# Patient Record
Sex: FEMALE | Race: WHITE | NOT HISPANIC OR LATINO | Employment: STUDENT | URBAN - METROPOLITAN AREA
[De-identification: names, ages, dates, MRNs, and addresses within clinical notes are randomized per-mention and may not be internally consistent; named-entity substitution may affect disease eponyms.]

---

## 2017-01-03 ASSESSMENT — ENCOUNTER SYMPTOMS
EYE REDNESS: 0
INCREASED ENERGY: 1
MUSCLE CRAMPS: 1
POLYPHAGIA: 0
POOR WOUND HEALING: 0
ALTERED TEMPERATURE REGULATION: 0
HOT FLASHES: 1
MEMORY LOSS: 0
ARTHRALGIAS: 1
EYE IRRITATION: 1
MUSCLE WEAKNESS: 1
SEIZURES: 0
HEADACHES: 1
BREAST MASS: 1
HALLUCINATIONS: 0
WEIGHT LOSS: 0
FATIGUE: 1
BACK PAIN: 1
SINUS PAIN: 1
JOINT SWELLING: 0
LOSS OF CONSCIOUSNESS: 0
TINGLING: 0
BREAST PAIN: 1
MYALGIAS: 1
FEVER: 0
NECK PAIN: 1
SORE THROAT: 0
NUMBNESS: 1
SPEECH CHANGE: 0
HOARSE VOICE: 0
SMELL DISTURBANCE: 0
NAIL CHANGES: 0
EYE PAIN: 0
STIFFNESS: 1
TROUBLE SWALLOWING: 0
DISTURBANCES IN COORDINATION: 0
TASTE DISTURBANCE: 0
SINUS CONGESTION: 1
DIZZINESS: 0
POLYDIPSIA: 0
DECREASED LIBIDO: 0
WEAKNESS: 1
TREMORS: 1
EYE WATERING: 0
CHILLS: 0
DECREASED APPETITE: 0
PARALYSIS: 0
WEIGHT GAIN: 0
NECK MASS: 0
SKIN CHANGES: 0
NIGHT SWEATS: 1
DOUBLE VISION: 0

## 2017-01-06 ENCOUNTER — ONCOLOGY VISIT (OUTPATIENT)
Dept: ONCOLOGY | Facility: CLINIC | Age: 22
End: 2017-01-06
Attending: INTERNAL MEDICINE
Payer: COMMERCIAL

## 2017-01-06 VITALS
HEIGHT: 62 IN | WEIGHT: 147.2 LBS | TEMPERATURE: 98.4 F | SYSTOLIC BLOOD PRESSURE: 120 MMHG | OXYGEN SATURATION: 97 % | HEART RATE: 81 BPM | DIASTOLIC BLOOD PRESSURE: 78 MMHG | RESPIRATION RATE: 16 BRPM | BODY MASS INDEX: 27.09 KG/M2

## 2017-01-06 DIAGNOSIS — D47.02 MAST CELL DISEASE, SYSTEMIC: Primary | ICD-10-CM

## 2017-01-06 PROCEDURE — 99212 OFFICE O/P EST SF 10 MIN: CPT | Mod: ZF

## 2017-01-06 PROCEDURE — 99214 OFFICE O/P EST MOD 30 MIN: CPT | Mod: ZP | Performed by: PHYSICIAN ASSISTANT

## 2017-01-06 ASSESSMENT — PAIN SCALES - GENERAL: PAINLEVEL: SEVERE PAIN (7)

## 2017-01-06 NOTE — NURSING NOTE
"Alex Rivas is a 21 year old female who presents for:  Chief Complaint   Patient presents with     Oncology Clinic Visit     Possible Mast Cell Activation Disorder        Initial Vitals:  /78 mmHg  Pulse 81  Temp(Src) 98.4  F (36.9  C) (Oral)  Resp 16  Ht 1.575 m (5' 2.01\")  Wt 66.769 kg (147 lb 3.2 oz)  BMI 26.92 kg/m2  SpO2 97%  LMP 12/28/2016 (Exact Date) Estimated body mass index is 26.92 kg/(m^2) as calculated from the following:    Height as of this encounter: 1.575 m (5' 2.01\").    Weight as of this encounter: 66.769 kg (147 lb 3.2 oz).. Body surface area is 1.71 meters squared. BP completed using cuff size: regular  Severe Pain (7) Patient's last menstrual period was 12/28/2016 (exact date). Allergies and medications reviewed.     Medications: Medication refills not needed today.  Pharmacy name entered into Go[employer]: [id number] - [address] [facility] THE [facility]    Comments:     7 minutes for nursing intake (face to face time)   Nohemy Newberry LPN          "

## 2017-01-06 NOTE — Clinical Note
"1/6/2017       RE: Alex Rivas  [address] [facility] [address]     Dear Colleague,    Thank you for referring your patient, Alex Rivas, to the [facility]. Please see a copy of my visit note below.    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  Please see his last note for PMH. The following was cut/paste from Dr Maynard's last clinic visit:     \"Therefore, based on (1) a clinical history highly consistent with chronic/recurrent aberrant mast cell  release, (2) an elevated plasma histamine in 9/15 (13 nmol/L, normal 0-8) (but a normal serum tryptase, largely ruling out systemic mastocytosis), (3) substantially increased mast cells (as high as 60/hpf, upper normal generally regarded as 20/hpf; note that, while elevated, this is nowhere near as high, nor in the typical aggregated pattern, as seen in systemic mastocytosis) seen in every single one of a slew of upper and lower GI tract biopsies obtained at [facility] in [place], [place] in 8/11, (4) absence of any other evident disease better accounting for the full range and chronicity of all the symptoms and findings in the case, and (5) at least partial response to mast-cell-targeted therapeutics (e.g., Benadryl), mast cell activation syndrome (MCAS; not systemic mastocytosis) is the underlying, unifying diagnosis (per Alexandro et al., J Hematol Oncol 2011) for the patient's almost-lifelong complex of multisystem polymorbidity of generally inflammatory +/- allergic theme.\"    INTERVAL HISTORY: Alex hasn't had much progress since I saw her last.  " "She did try a month of the Vitamin C with no effect.  She also tried the Quercetin but after a few days developed headaches.  She ended her college year sinusitis, ear infection, UTI and mast cell flare ups.  Since coming home she has been doing much better.  This weekend she had a two day flare, unknown what caused it, but had joint pains/full body arthritis and fatigue and basically slept for 2 days.  She is feeling better now.  She has been taking Claritin 10 mg BID and Zantac 150 mg BID and unsure if its really helping or not.    Last summer she really cleaned up her diet and only has a small amount of food she eats, however with cutting out gluten, dairy, sugar and many other things (night shades) she has felt better, been able to focus in school and feel healthier.  Still has occ headaches, joint pains and diarrhea.     EXAM:  /78 mmHg  Pulse 81  Temp(Src) 98.4  F (36.9  C) (Oral)  Resp 16  Ht 1.575 m (5' 2.01\")  Wt 66.769 kg (147 lb 3.2 oz)  BMI 26.92 kg/m2  SpO2 97%  LMP 12/28/2016 (Exact Date)  Wt Readings from Last 4 Encounters:   01/06/17 66.769 kg (147 lb 3.2 oz)   08/05/16 66.6 kg (146 lb 13.2 oz)   06/02/16 62.234 kg (137 lb 3.2 oz)   02/19/16 63.9 kg (140 lb 14 oz)     Well appearing 20 year old female, no apparent hives/itching/flushing.   Breathing well without stridor/wheeze.   Happy and communicative    LABS: no new labs    ASSESSMENT/PLAN: MCAS in a 21 year old college student who recently started on H1/H2 blockade with improvement in her abdominal pain.     Alex and her mom and I reviewed her diet which is no gluten, no dairy and low histamine and really given her the best relief.  She still had flares at the end of the school year which seemed triggered by infection, this is her typical pattern.  She is unaware what made her flare this last weekend but we reviewed a few things to keep an eye on.  In the meantime, her biggest symptoms continue to be joint pains and fatigue.  "     We discussed next trials.  First, I am unsure if she is deriving any benefit from the Claritin and I would like her to try Zyrtec and Allegra trials to see if one helps her better than the other.  Second, baby aspirin would be a good option as joint pains is her biggest issue.  I discussed the starting dose of 81 mg BID and then to escalate up to 4 pills BID over a month.  The third trial would be singulair.  We reviewed all the escalations and one month trials for each.   She will be back end of summer prior to going back to school.     Sana Buchanan PA-C      Answers for HPI/ROS submitted by the patient on 1/3/2017   General Symptoms: Yes  Skin Symptoms: Yes  HENT Symptoms: Yes  EYE SYMPTOMS: Yes  HEART SYMPTOMS: No  LUNG SYMPTOMS: No  INTESTINAL SYMPTOMS: No  URINARY SYMPTOMS: No  GYNECOLOGIC SYMPTOMS: Yes  BREAST SYMPTOMS: Yes  SKELETAL SYMPTOMS: Yes  BLOOD SYMPTOMS: No  NERVOUS SYSTEM SYMPTOMS: Yes  MENTAL HEALTH SYMPTOMS: No  Fever: No  Loss of appetite: No  Weight loss: No  Weight gain: No  Fatigue: Yes  Night sweats: Yes  Chills: No  Increased stress: Yes  Excessive hunger: No  Excessive thirst: No  Feeling hot or cold when others believe the temperature is normal: No  Loss of height: No  Post-operative complications: No  Surgical site pain: No  Hallucinations: No  Change in or Loss of Energy: Yes  Hyperactivity: No  Confusion: No  Changes in hair: No  Changes in moles/birth marks: No  Itching: Yes  Rashes: No  Changes in nails: No  Acne: Yes  Hair in places you don't want it: No  Change in facial hair: No  Warts: No  Non-healing sores: No  Scarring: No  Flaking of skin: No  Color changes of hands/feet in cold : No  Sun sensitivity: Yes  Skin thickening: No  Ear pain: No  Ear discharge: No  Hearing loss: No  Tinnitus: No  Nosebleeds: No  Congestion: Yes  Sinus pain: Yes  Trouble swallowing: No   Voice hoarseness: No  Mouth sores: No  Sore throat: No  Tooth pain: Yes  Gum tenderness: No  Bleeding gums:  No  Change in taste: No  Change in sense of smell: No  Dry mouth: No  Hearing aid used: No  Neck lump: No  Eye pain: No  Vision loss: No  Dry eyes: No  Watery eyes: No  Eye bulging: No  Double vision: No  Flashing of lights: No  Spots: Yes  Floaters: Yes  Redness: No  Crossed eyes: No  Tunnel Vision: No  Yellowing of eyes: No  Eye irritation: Yes  Back pain: Yes  Muscle aches: Yes  Neck pain: Yes  Swollen joints: No  Joint pain: Yes  Bone pain: Yes  Muscle cramps: Yes  Muscle weakness: Yes  Joint stiffness: Yes  Bone fracture: No  Trouble with coordination: No  Dizziness or trouble with balance: No  Fainting or black-out spells: No  Memory loss: No  Headache: Yes  Seizures: No  Speech problems: No  Tingling: No  Tremor: Yes  Weakness: Yes  Difficulty walking: No  Paralysis: No  Numbness: Yes  Bleeding or spotting between periods: No  Heavy or painful periods: Yes  Irregular periods: No  Vaginal discharge: No  Hot flashes: Yes  Vaginal dryness: No  Genital ulcers: No  Reduced libido: No  Painful intercourse: No  Difficulty with sexual arousal: No  Post-menopausal bleeding: No  Discharge: No  Lumps: Yes  Pain: Yes  Nipple retraction: No        Again, thank you for allowing me to participate in the care of your patient.      Sincerely,    Debo Buchanan PA-C

## 2017-01-06 NOTE — PROGRESS NOTES
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  Please see his last note for PMH. The following was cut/paste from Dr Maynard's last clinic visit:     \"Therefore, based on (1) a clinical history highly consistent with chronic/recurrent aberrant mast cell  release, (2) an elevated plasma histamine in 9/15 (13 nmol/L, normal 0-8) (but a normal serum tryptase, largely ruling out systemic mastocytosis), (3) substantially increased mast cells (as high as 60/hpf, upper normal generally regarded as 20/hpf; note that, while elevated, this is nowhere near as high, nor in the typical aggregated pattern, as seen in systemic mastocytosis) seen in every single one of a slew of upper and lower GI tract biopsies obtained at [facility] in [place], [place] in 8/11, (4) absence of any other evident disease better accounting for the full range and chronicity of all the symptoms and findings in the case, and (5) at least partial response to mast-cell-targeted therapeutics (e.g., Benadryl), mast cell activation syndrome (MCAS; not systemic mastocytosis) is the underlying, unifying diagnosis (per Alexandro et al., J Hematol Oncol 2011) for the patient's almost-lifelong complex of multisystem polymorbidity of generally inflammatory +/- allergic theme.\"    INTERVAL HISTORY: Alex is doing much better from our last visit. She is taking Allegra BID and Zantac BID and only had one flare up in the fall. This is pretty good considering school stress/diet, etc usually triggers her.  Her biggest issue is muscle and joint pain that persists outside of her usual flares.  She tried " "Vit C, Quercetin with no effect. She tried ASA and this made her have a HA. Currently she takes Aleve prn, unsure if this helps.  What helps the most is getting a massage once a week, but the effects don't last as long as she would like.  She continues her strict diet with out gluten, dairy, sugar and many other things (night shades).    EXAM:  /78 mmHg  Pulse 81  Temp(Src) 98.4  F (36.9  C) (Oral)  Resp 16  Ht 1.575 m (5' 2.01\")  Wt 66.769 kg (147 lb 3.2 oz)  BMI 26.92 kg/m2  SpO2 97%  LMP 12/28/2016 (Exact Date)  Wt Readings from Last 4 Encounters:   01/06/17 66.769 kg (147 lb 3.2 oz)   08/05/16 66.6 kg (146 lb 13.2 oz)   06/02/16 62.234 kg (137 lb 3.2 oz)   02/19/16 63.9 kg (140 lb 14 oz)     Well appearing 20 year old female, no apparent hives/itching/flushing.   Breathing well without stridor/wheeze.   Happy and communicative    LABS: no new labs    ASSESSMENT/PLAN: MCAS in a 21 year old college student with [employer]    Alex and her mom and I reviewed her diet which is no gluten, no dairy and low histamine and really given her the best relief.  She is so glad that after returning to school this fall she has only had one flare up.  She thinks the Allegra works much better for her and after reviewing with pharmacy, she found out there was lactose in the other H1 formularies, likely contributing to the intolerance.   In the meantime, her biggest symptoms continue to be joint pains and fatigue.      She and mom discussed PT for some of the joint and muscle pains and overall tightness. I think this is a good non-pharma option for her to get into some routine exercise and to help with stretching and mobility.  She would also like to entertain acupuncture.  I don't have much experience with MCAS patients and acupuncture, but I don't see the harm in trying.  I encouraged a low inflammatory diet to help with the inflammatory joints.  Also encouraged a trial of alpha-lipoic acid which can help with mast cell " related inflammatory pain.  She mentioned at the end of our visit new acne since last June.  Likely 2/2 to a medication change and she admitted her vitamin source had changed. Encouraged her to look into the excipients as that was likely the cause.     Return in 3 months    Sana Buchanan PA-C      Answers for HPI/ROS submitted by the patient on 1/3/2017   General Symptoms: Yes  Skin Symptoms: Yes  HENT Symptoms: Yes  EYE SYMPTOMS: Yes  HEART SYMPTOMS: No  LUNG SYMPTOMS: No  INTESTINAL SYMPTOMS: No  URINARY SYMPTOMS: No  GYNECOLOGIC SYMPTOMS: Yes  BREAST SYMPTOMS: Yes  SKELETAL SYMPTOMS: Yes  BLOOD SYMPTOMS: No  NERVOUS SYSTEM SYMPTOMS: Yes  MENTAL HEALTH SYMPTOMS: No  Fever: No  Loss of appetite: No  Weight loss: No  Weight gain: No  Fatigue: Yes  Night sweats: Yes  Chills: No  Increased stress: Yes  Excessive hunger: No  Excessive thirst: No  Feeling hot or cold when others believe the temperature is normal: No  Loss of height: No  Post-operative complications: No  Surgical site pain: No  Hallucinations: No  Change in or Loss of Energy: Yes  Hyperactivity: No  Confusion: No  Changes in hair: No  Changes in moles/birth marks: No  Itching: Yes  Rashes: No  Changes in nails: No  Acne: Yes  Hair in places you don't want it: No  Change in facial hair: No  Warts: No  Non-healing sores: No  Scarring: No  Flaking of skin: No  Color changes of hands/feet in cold : No  Sun sensitivity: Yes  Skin thickening: No  Ear pain: No  Ear discharge: No  Hearing loss: No  Tinnitus: No  Nosebleeds: No  Congestion: Yes  Sinus pain: Yes  Trouble swallowing: No   Voice hoarseness: No  Mouth sores: No  Sore throat: No  Tooth pain: Yes  Gum tenderness: No  Bleeding gums: No  Change in taste: No  Change in sense of smell: No  Dry mouth: No  Hearing aid used: No  Neck lump: No  Eye pain: No  Vision loss: No  Dry eyes: No  Watery eyes: No  Eye bulging: No  Double vision: No  Flashing of lights: No  Spots: Yes  Floaters: Yes  Redness:  No  Crossed eyes: No  Tunnel Vision: No  Yellowing of eyes: No  Eye irritation: Yes  Back pain: Yes  Muscle aches: Yes  Neck pain: Yes  Swollen joints: No  Joint pain: Yes  Bone pain: Yes  Muscle cramps: Yes  Muscle weakness: Yes  Joint stiffness: Yes  Bone fracture: No  Trouble with coordination: No  Dizziness or trouble with balance: No  Fainting or black-out spells: No  Memory loss: No  Headache: Yes  Seizures: No  Speech problems: No  Tingling: No  Tremor: Yes  Weakness: Yes  Difficulty walking: No  Paralysis: No  Numbness: Yes  Bleeding or spotting between periods: No  Heavy or painful periods: Yes  Irregular periods: No  Vaginal discharge: No  Hot flashes: Yes  Vaginal dryness: No  Genital ulcers: No  Reduced libido: No  Painful intercourse: No  Difficulty with sexual arousal: No  Post-menopausal bleeding: No  Discharge: No  Lumps: Yes  Pain: Yes  Nipple retraction: No

## 2017-04-10 ASSESSMENT — ENCOUNTER SYMPTOMS
INCREASED ENERGY: 1
NIGHT SWEATS: 1
SINUS PAIN: 1
BLOOD IN STOOL: 0
POOR WOUND HEALING: 0
MEMORY LOSS: 0
ABDOMINAL PAIN: 1
HALLUCINATIONS: 0
HEADACHES: 1
HEARTBURN: 0
PARALYSIS: 0
SKIN CHANGES: 0
TINGLING: 1
INSOMNIA: 1
SPEECH CHANGE: 0
JOINT SWELLING: 1
RECTAL PAIN: 0
SINUS CONGESTION: 1
WEIGHT LOSS: 0
FATIGUE: 1
VOMITING: 0
DOUBLE VISION: 0
SORE THROAT: 0
ARTHRALGIAS: 1
CONSTIPATION: 0
NERVOUS/ANXIOUS: 1
MUSCLE WEAKNESS: 1
BREAST PAIN: 0
BLOATING: 1
NUMBNESS: 1
NAIL CHANGES: 0
DECREASED APPETITE: 0
ALTERED TEMPERATURE REGULATION: 1
BREAST MASS: 1
DIARRHEA: 1
DECREASED CONCENTRATION: 1
DISTURBANCES IN COORDINATION: 0
DIZZINESS: 0
MYALGIAS: 1
TREMORS: 0
BOWEL INCONTINENCE: 0
HOT FLASHES: 1
POLYPHAGIA: 0
TASTE DISTURBANCE: 0
SEIZURES: 0
WEAKNESS: 1
DECREASED LIBIDO: 0
EYE REDNESS: 0
JAUNDICE: 0
EYE WATERING: 1
DEPRESSION: 0
POLYDIPSIA: 0
BACK PAIN: 1
STIFFNESS: 1
WEIGHT GAIN: 1
EYE PAIN: 0
EYE IRRITATION: 1
NECK MASS: 0
PANIC: 0
FEVER: 0
SMELL DISTURBANCE: 0
NAUSEA: 0
MUSCLE CRAMPS: 1
TROUBLE SWALLOWING: 0
LOSS OF CONSCIOUSNESS: 0
RECTAL BLEEDING: 0
NECK PAIN: 1
CHILLS: 0
HOARSE VOICE: 0

## 2017-04-11 ENCOUNTER — ONCOLOGY VISIT (OUTPATIENT)
Dept: ONCOLOGY | Facility: CLINIC | Age: 22
End: 2017-04-11
Attending: INTERNAL MEDICINE
Payer: COMMERCIAL

## 2017-04-11 VITALS
TEMPERATURE: 96.7 F | HEIGHT: 62 IN | SYSTOLIC BLOOD PRESSURE: 115 MMHG | DIASTOLIC BLOOD PRESSURE: 79 MMHG | WEIGHT: 153.4 LBS | HEART RATE: 79 BPM | OXYGEN SATURATION: 100 % | RESPIRATION RATE: 18 BRPM | BODY MASS INDEX: 28.23 KG/M2

## 2017-04-11 DIAGNOSIS — D89.42 IDIOPATHIC MAST CELL ACTIVATION SYNDROME (H): Primary | Chronic | ICD-10-CM

## 2017-04-11 PROCEDURE — 99215 OFFICE O/P EST HI 40 MIN: CPT | Mod: ZP | Performed by: INTERNAL MEDICINE

## 2017-04-11 PROCEDURE — 99212 OFFICE O/P EST SF 10 MIN: CPT | Mod: ZF

## 2017-04-11 ASSESSMENT — PAIN SCALES - GENERAL: PAINLEVEL: SEVERE PAIN (7)

## 2017-04-11 NOTE — PROGRESS NOTES
"Patient: Alex Rivas   (MRN [id number])   Encounter Date: Apr 11, 2017  Referring: Didi Polo M.D. ([facility], [address] [place] [address], [address], [place] [address], [phone], fax [phone])  Attending: Harinder Lassiter M.D.     Current Diagnosis: Based on (1) a clinical history highly consistent with chronic/recurrent aberrant mast cell  release, (2) an elevated plasma histamine in 9/15 (13 nmol/L, normal 0-8) (but a normal serum tryptase, largely ruling out systemic mastocytosis), (3) substantially increased mast cells (as high as 60/hpf, upper normal generally regarded as 20/hpf; note that, while elevated, this is nowhere near as high, nor in the typical aggregated pattern, as seen in systemic mastocytosis) seen in every single one of a slew of upper and lower GI tract biopsies obtained at [facility] in [place], [place] in 8/11, (4) absence of any other evident disease better accounting for the full range and chronicity of all the symptoms and findings in the case, and (5) at least partial response to mast-cell-targeted therapeutics (e.g., Benadryl), mast cell activation syndrome (MCAS; 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  Bidirectional endoscopy a month after onset of these symptoms found only mild chronic inflammation in the sole (gastric) biopsy obtained.  No clear cause was identified, and the patient says no specific treatment recommendations were provided.  She then underwent allergy evaluation (looking for celiac disease among other possibilities), but all of this evaluation was negative, too.  She continued " "to suffer recurring (often post-prandial) episodes of about three hours of intense epigastric pain and diarrhea followed by a day and a half of feeling utterly \"wiped out.\"  She next consulted a nutritionist who advised starting Prevacid and changing to a gluten- and dairy-free diet.  She still suffered her \"episodes\" but found she could not tolerate a wider range of foods in this fashion.  At age 17 she came to be afflicted by essentially monthly episodes of sinusitis and/or bronchitis.  Episodes of hot flashes and new severe dysmenorrhea (cramps and vomiting) on the first day of her menstrual cycles emerged.  She was again evaluated by an allergist with essentially negative findings.  Vitamin D deficiency was found.  A new problem with constant headaches (pain rating 7/10) emerged.  At age 18 she suffered sudden onset of left eyelid droop and partial left facial numbness.  She saw a neurologist.  Evaluation was unrevealing.  She tried many migraine medications, finding they helped little and each caused various unsettling side effects.  Multi-level spinal column pain, diffuse cutaneous hypersensitivity, and new numbness in the extremities and diffuse tremors emerged.  Multiple further GI evaluations all continued to be unrevealing.  A lower back subcutaneous abscess developed.  She saw many back specialists for back pain; all of those evaluations were unrevealing.  Physical therapy and chiropractic were unhelpful.  She was diagnosed with spondylolisthesis.  She suffered ongoing sinus infections.  She began suffering orthopedic injuries (e.g., sprains) from trivial degrees of stress/trauma.  She was slow to heal from all of these injuries.  Evaluation by an ENT specialist was essentially unrevealing (a deviated septum was noted).  Her cognitive function, and performance at school, declined; she was diagnosed with a learning disorder.  She was referred to the [facility] for evaluation and was diagnosed with " visceral hypersensitivity and fibromyalgia.  She consulted with allergist/immunologist Blade Adams in the local area here and was diagnosed with IgG4 deficiency.  At age 19 she began suffering waxing/waning adenitis/adenopathy in the bilateral axillary regions.  Ultrasound was unrevealing.  The problem was unimproved with multiple changes of deodorants.  At age 20 she began suffering intermittent chest pain.  Pleurisy was diagnosed.  Fatigue worsened.  Intermittent left pupillary enlargement was noted.  Ophthalmologic evaluation was unrevealing and she was diagnosed with benign episodic pupillary enlargement.  She consulted with Erik Polo and Smith.  Dr. Mtz found a homocysteine level about double the upper limit of normal.  He also found deficiencies of various B vitamins and folate.  She had been suffering insomnia and discovered that Benadryl helped this significantly.  She continued suffering erratic anaphylaxis, reacting to certain foods at some point and not at other points.  She obtained genomic analysis from [address] [place] [address], which reported the usual plethora of polymorphisms including homozygous MTHFR C677T.  A CBCD in 5/15 was normal.  Dr. Mtz suggested her problems might be due to mast cell disease and recommended she consult here.  [facility] is additionally notable for abdominal pain, chronic headache, fatigue, chronic back pain, bloating, irritable bowel syndrome, chronic neck pain, food intolerance, insomnia, watery eyes, stuffy nose, sinus problems, hay fever, sneezing attacks, excessive mucus, back acne, dry skin, hair loss, excessive sweat, easy bruising, earaches, dyspnea on mild exertion, fatigue, lethargy, restlessness, arthralgias, stiffness, myalgias, weakness, poor memory, poor concentration, learning disabilities, anxiety, panic, worry, seasonal affective disorder, cold intolerance, hot flashes, night sweats, frequent urination, painful cramping, frequent hunger, contact dermatitis,  "urticaria, and deteriorating vision.  On a full ROS at her initial visit here in 9/15, she endorsed a wide range of chronic/recurrent, episodic/intermittent and/or waxing/waning issues including subjective (but not objective) fevers, flushing, fatigue (often to the point of exhaustion), malaise, headaches, diffusely migratory aching/pain, unprovoked soaking sweats, unprovoked fluctuations in weight and appetite, irritation of the eyes, tinnitus, easy bruising, sinonasal congestion, coryza, non-anginal chest discomfort/pain, rare gastroesophageal reflux, vomiting on the first day of her menstrual cycles, diarrhea alternating with constipation, rare diffusely migratory abdominal discomfort, bloating, diffusely migratory weakness, occasional edema about her wrists, tingling/numbness in the left upper extremity when she sits up straight and thrusts her shoulders back, waxing/waning bilateral axillary adenopathy and adenitis, orthostatic and non-orthostatic presyncope, hair loss (better since vitamin supplementation was started), poor growth of her great toenails, poor healing, and insomnia (greatly helped by Benadryl).  Family history is notable for a mother with hypertension, a father with an AVM, kidney stones, hypertension, and heart disease, a sister with hypothyroidism, and another sister with \"a slew of stomach issues and allergy issues.\" The patient denies any history of smoking, significant alcohol use (it quickly causes her a headache), or illegal substance use.    Current Therapy: Cetirizine 10 mg bid, clindamycin solution topically bid, diphenhydramine 25 mg qhs, ranitidine 150 mg bid, methylcobalamin 2500 mcg/d, vitamin D 01689 units/d, vitamin C 1000 mg qd, fish oil 1 g/d, Ultra Dorene 60 units/d, Adderall 10 mg/d, and a daily multivitamin..  She lists her current allergies as severe GI disturbance to dairy products and anaphylaxis to gluten.    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  She has not noticed any improvements from these antihistamines changes.  She says the above-noted antihistamines changes are the only changes she has made in her regimen since she was last here several weeks ago for a visit with Ms. Buchanan.  She says exercise makes her diffuse pain worse.  No other fundamentally new problems.  ROS o/w neg.    Exam finds a slightly overweight young woman in no apparent distress at all despite a pain score of 7/10 (up from 4/10 when I last saw her in 8/16), pleasant/happy, cooperative, fully alert and oriented, easily independently ambulatory, presenting again with her mother.  Vitals per chart, unremarkable but for weight up another 6 pounds since 8/16 to 153 pounds.  Key findings are her comfortable general appearance, HEENT benign, no plethora/pallor/diaphoresis/jaundice/rash/bleeding/bruising, neck supple (no apparent limitations in range of motion and no pain behaviors on ROM), no JVD or thyromegaly or carotid bruits, no palpable adenopathy or tenderness at any of the usual node-bearing sites (note continuing complete resolution of her original presenting bilateral (worse on left) axillary tenderness), clear lungs, regular heart with no adventitious sounds, abdomen benign (note continuing complete resolution of her original presenting minimal epigastric tenderness), no peripheral edema, neuro grossly intact.  She does report some tenderness once again on  palpation generally about the neck and generally about the lower spinal column.   strength appears normal and did not appear to provoke pain behaviors.  Previously, on a light scratch test on the upper back at her initial visit here in 9/15, moderately bright dermatographism (erythroderma only, no hives) quickly emerged and was almost fully sustained when last checked 10 minutes later, but we did not recheck this today.    No new labs here today.  She shows me labs run by one of her other providers in 3/17 showing normal routine blood counts (except for a very slight decrease in red cell count) and routine chemistries (except for ALT 73 (normal 0-45)), normal iron studies and thyroid function tests, normal ESR and CRP, normal homocysteine, normal anti-thyroid antibodies, normal vitamin D, normal insulin, and normal anti-CCP.  Lyme antibody testing showed a positive IgG p41 and a positive IgM p23. She cannot recall any recent tick bites (or activities/travel that would have placed her at increased risk for such), let alone the characteristic rash of Lyme disease.    A/P: Underlying/unifying diagnosis (MCAS) as detailed above, seems clearly worse for having made what would seem to be some very innocent antihistamine changes -- thus strongly suggesting her worsening most likely is due to reactivity of her dysfunctional mast cells to one or more of the excipients in her current Zyrtec and/or Zantac formulations.  I recommended she return to her former once-daily Claritin/Zantac regimen to see if she can regain her prior baseline, and if that proves to be the case, then she will need to move more cautiously (e.g., next try increasing Zantac from qd to bid) to try to figure out which of the two antihistamine changes she made at the same time in this last interval likely caused her to feel worse, and once the drug (Zyrtec or Zantac) causing the worsening has been identified, then it would behoove her to work with her  "pharmacist to find and try alternative formulations of the culprit drug which do not contain the suspected offending excipient(s).  She will also try to compare the excipients in her Zantac and Zyrtec to the excipients in the medication products she previously has not tolerated (e.g., aspirin).  (Note she tolerates naproxen just fine, suggesting she probably tolerates aspirin OK, implying that her poor tolerance of the one aspirin product she tried was directed not at the aspirin but rather at one or more of the excipients in that particular aspirin product.)  She appears to understand.  I once again reminded her that \"Step 1\" in managing most mast cell disorders is to identify (as precisely as possible), and then avoid (as much as possible), the triggers for activation flare-ups of the patient's dysfunctional mast cells.    As is usually the case with visits for mast cell disease, this was a long encounter, 50 minutes in total, with 40 minutes spent in counseling.      Typed, reviewed, and electronically signed by: Harinder Lassiter M.D.     DT: 04/11/2017 11:54 PM    cc: 1. Didi Polo M.D. ([facility], [address] [place] [address], [address], [place] [address], [phone], fax [phone])  "

## 2017-04-11 NOTE — LETTER
"4/11/2017       RE: Alex Rivas  [address]  [facility] [address]     Dear Colleague,    Thank you for referring your patient, Alex Rivas, to the [facility]. Please see a copy of my visit note below.    Patient: Alex Rivas   (MRN [id number])   Encounter Date: Apr 11, 2017  Referring: Didi Polo M.D. ([facility], [address] [place] [address], [address], [place] [id number], [phone], fax [phone])  Attending: Harinder Lassiter M.D.     Current Diagnosis: Based on (1) a clinical history highly consistent with chronic/recurrent aberrant mast cell  release, (2) an elevated plasma histamine in 9/15 (13 nmol/L, normal 0-8) (but a normal serum tryptase, largely ruling out systemic mastocytosis), (3) substantially increased mast cells (as high as 60/hpf, upper normal generally regarded as 20/hpf; note that, while elevated, this is nowhere near as high, nor in the typical aggregated pattern, as seen in systemic mastocytosis) seen in every single one of a slew of upper and lower GI tract biopsies obtained at [facility] in [place], [place] in 8/11, (4) absence of any other evident disease better accounting for the full range and chronicity of all the symptoms and findings in the case, and (5) at least partial response to mast-cell-targeted therapeutics (e.g., Benadryl), mast cell activation syndrome (MCAS; 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  Bidirectional endoscopy a month after onset of these symptoms found only mild chronic inflammation in the sole (gastric) biopsy obtained.  No clear cause was " "identified, and the patient says no specific treatment recommendations were provided.  She then underwent allergy evaluation (looking for celiac disease among other possibilities), but all of this evaluation was negative, too.  She continued to suffer recurring (often post-prandial) episodes of about three hours of intense epigastric pain and diarrhea followed by a day and a half of feeling utterly \"wiped out.\"  She next consulted a nutritionist who advised starting Prevacid and changing to a gluten- and dairy-free diet.  She still suffered her \"episodes\" but found she could not tolerate a wider range of foods in this fashion.  At age 17 she came to be afflicted by essentially monthly episodes of sinusitis and/or bronchitis.  Episodes of hot flashes and new severe dysmenorrhea (cramps and vomiting) on the first day of her menstrual cycles emerged.  She was again evaluated by an allergist with essentially negative findings.  Vitamin D deficiency was found.  A new problem with constant headaches (pain rating 7/10) emerged.  At age 18 she suffered sudden onset of left eyelid droop and partial left facial numbness.  She saw a neurologist.  Evaluation was unrevealing.  She tried many migraine medications, finding they helped little and each caused various unsettling side effects.  Multi-level spinal column pain, diffuse cutaneous hypersensitivity, and new numbness in the extremities and diffuse tremors emerged.  Multiple further GI evaluations all continued to be unrevealing.  A lower back subcutaneous abscess developed.  She saw many back specialists for back pain; all of those evaluations were unrevealing.  Physical therapy and chiropractic were unhelpful.  She was diagnosed with spondylolisthesis.  She suffered ongoing sinus infections.  She began suffering orthopedic injuries (e.g., sprains) from trivial degrees of stress/trauma.  She was slow to heal from all of these injuries.  Evaluation by an ENT specialist was " essentially unrevealing (a deviated septum was noted).  Her cognitive function, and performance at school, declined; she was diagnosed with a learning disorder.  She was referred to the [facility] for evaluation and was diagnosed with visceral hypersensitivity and fibromyalgia.  She consulted with allergist/immunologist Blade Adams in the local area here and was diagnosed with IgG4 deficiency.  At age 19 she began suffering waxing/waning adenitis/adenopathy in the bilateral axillary regions.  Ultrasound was unrevealing.  The problem was unimproved with multiple changes of deodorants.  At age 20 she began suffering intermittent chest pain.  Pleurisy was diagnosed.  Fatigue worsened.  Intermittent left pupillary enlargement was noted.  Ophthalmologic evaluation was unrevealing and she was diagnosed with benign episodic pupillary enlargement.  She consulted with Erik Polo and Smith.  Dr. Mtz found a homocysteine level about double the upper limit of normal.  He also found deficiencies of various B vitamins and folate.  She had been suffering insomnia and discovered that Benadryl helped this significantly.  She continued suffering erratic anaphylaxis, reacting to certain foods at some point and not at other points.  She obtained genomic analysis from [address] [place] [address], which reported the usual plethora of polymorphisms including homozygous MTHFR C677T.  A CBCD in 5/15 was normal.  Dr. Mtz suggested her problems might be due to mast cell disease and recommended she consult here.  [facility] is additionally notable for abdominal pain, chronic headache, fatigue, chronic back pain, bloating, irritable bowel syndrome, chronic neck pain, food intolerance, insomnia, watery eyes, stuffy nose, sinus problems, hay fever, sneezing attacks, excessive mucus, back acne, dry skin, hair loss, excessive sweat, easy bruising, earaches, dyspnea on mild exertion, fatigue, lethargy, restlessness, arthralgias, stiffness, myalgias,  "weakness, poor memory, poor concentration, learning disabilities, anxiety, panic, worry, seasonal affective disorder, cold intolerance, hot flashes, night sweats, frequent urination, painful cramping, frequent hunger, contact dermatitis, urticaria, and deteriorating vision.  On a full ROS at her initial visit here in 9/15, she endorsed a wide range of chronic/recurrent, episodic/intermittent and/or waxing/waning issues including subjective (but not objective) fevers, flushing, fatigue (often to the point of exhaustion), malaise, headaches, diffusely migratory aching/pain, unprovoked soaking sweats, unprovoked fluctuations in weight and appetite, irritation of the eyes, tinnitus, easy bruising, sinonasal congestion, coryza, non-anginal chest discomfort/pain, rare gastroesophageal reflux, vomiting on the first day of her menstrual cycles, diarrhea alternating with constipation, rare diffusely migratory abdominal discomfort, bloating, diffusely migratory weakness, occasional edema about her wrists, tingling/numbness in the left upper extremity when she sits up straight and thrusts her shoulders back, waxing/waning bilateral axillary adenopathy and adenitis, orthostatic and non-orthostatic presyncope, hair loss (better since vitamin supplementation was started), poor growth of her great toenails, poor healing, and insomnia (greatly helped by Benadryl).  Family history is notable for a mother with hypertension, a father with an AVM, kidney stones, hypertension, and heart disease, a sister with hypothyroidism, and another sister with \"a slew of stomach issues and allergy issues.\" The patient denies any history of smoking, significant alcohol use (it quickly causes her a headache), or illegal substance use.    Current Therapy: Cetirizine 10 mg bid, clindamycin solution topically bid, diphenhydramine 25 mg qhs, ranitidine 150 mg bid, methylcobalamin 2500 mcg/d, vitamin D 58904 units/d, vitamin C 1000 mg qd, fish oil 1 g/d, " Ultra Dorene 60 units/d, Adderall 10 mg/d, and a daily multivitamin..  She lists her current allergies as severe GI disturbance to dairy products and anaphylaxis to gluten.    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 returns in scheduled follow-up unfortunately reporting that since switching recently (at Sana Buchanan's reasonable suggestion) from Claritin 10 mg po qd and Zantac 150 mg po qd to Zyrtec 10 mg po bid and Zantac 150 mg po bid, she has done worse, with increasing pain in the neck, back, and especially the joints, plus increasing weight in spite of a healthy diet (she says a dietician she consulted told her she shouldn't be putting on weight with her current diet).  She has not noticed any improvements from these antihistamines changes.  She says the above-noted antihistamines changes are the only changes she has made in her regimen since she was last here several weeks ago for a visit with Ms. Buchanan.  She says exercise makes her diffuse pain worse.  No other fundamentally new problems.  ROS o/w neg.    Exam finds a slightly overweight young woman in no apparent distress at all despite a pain score of 7/10 (up from 4/10 when I last saw her in 8/16), pleasant/happy, cooperative, fully alert and oriented, easily independently ambulatory, presenting again with her mother.  Vitals per chart, unremarkable but for weight up another 6 pounds since 8/16 to 153 pounds.  Key findings are her comfortable general appearance, HEENT benign, no plethora/pallor/diaphoresis/jaundice/rash/bleeding/bruising, neck supple (no apparent limitations in range of motion and no pain behaviors on ROM), no JVD or thyromegaly or carotid bruits, no palpable adenopathy or tenderness at any of the usual node-bearing sites (note continuing complete resolution of her original presenting bilateral (worse on left) axillary tenderness), clear lungs, regular  heart with no adventitious sounds, abdomen benign (note continuing complete resolution of her original presenting minimal epigastric tenderness), no peripheral edema, neuro grossly intact.  She does report some tenderness once again on palpation generally about the neck and generally about the lower spinal column.   strength appears normal and did not appear to provoke pain behaviors.  Previously, on a light scratch test on the upper back at her initial visit here in 9/15, moderately bright dermatographism (erythroderma only, no hives) quickly emerged and was almost fully sustained when last checked 10 minutes later, but we did not recheck this today.    No new labs here today.  She shows me labs run by one of her other providers in 3/17 showing normal routine blood counts (except for a very slight decrease in red cell count) and routine chemistries (except for ALT 73 (normal 0-45)), normal iron studies and thyroid function tests, normal ESR and CRP, normal homocysteine, normal anti-thyroid antibodies, normal vitamin D, normal insulin, and normal anti-CCP.  Lyme antibody testing showed a positive IgG p41 and a positive IgM p23. She cannot recall any recent tick bites (or activities/travel that would have placed her at increased risk for such), let alone the characteristic rash of Lyme disease.    A/P: Underlying/unifying diagnosis (MCAS) as detailed above, seems clearly worse for having made what would seem to be some very innocent antihistamine changes -- thus strongly suggesting her worsening most likely is due to reactivity of her dysfunctional mast cells to one or more of the excipients in her current Zyrtec and/or Zantac formulations.  I recommended she return to her former once-daily Claritin/Zantac regimen to see if she can regain her prior baseline, and if that proves to be the case, then she will need to move more cautiously (e.g., next try increasing Zantac from qd to bid) to try to figure out which of  "the two antihistamine changes she made at the same time in this last interval likely caused her to feel worse, and once the drug (Zyrtec or Zantac) causing the worsening has been identified, then it would behoove her to work with her pharmacist to find and try alternative formulations of the culprit drug which do not contain the suspected offending excipient(s).  She will also try to compare the excipients in her Zantac and Zyrtec to the excipients in the medication products she previously has not tolerated (e.g., aspirin).  (Note she tolerates naproxen just fine, suggesting she probably tolerates aspirin OK, implying that her poor tolerance of the one aspirin product she tried was directed not at the aspirin but rather at one or more of the excipients in that particular aspirin product.)  She appears to understand.  I once again reminded her that \"Step 1\" in managing most mast cell disorders is to identify (as precisely as possible), and then avoid (as much as possible), the triggers for activation flare-ups of the patient's dysfunctional mast cells.    As is usually the case with visits for mast cell disease, this was a long encounter, 50 minutes in total, with 40 minutes spent in counseling.      Typed, reviewed, and electronically signed by: Harinder Lassiter M.D.     DT: 04/11/2017 11:54 PM    cc: 1. Didi Polo M.D. ([facility], [address] [place] [address], [address] [address], [phone], fax [phone])    Again, thank you for allowing me to participate in the care of your patient.      Sincerely,    Harinder Lassiter MD      "

## 2017-04-11 NOTE — NURSING NOTE
"Alex Rivas is a 21 year old female who presents for:  Chief Complaint   Patient presents with     Oncology Clinic Visit     Mast cell disease, systemic        Initial Vitals:  /79 (BP Location: Left arm, Patient Position: Chair, Cuff Size: Adult Regular)  Pulse 79  Temp 96.7  F (35.9  C) (Oral)  Resp 18  Ht 1.575 m (5' 2.01\")  Wt 69.6 kg (153 lb 6.4 oz)  LMP 03/30/2017  SpO2 100%  BMI 28.05 kg/m2 Estimated body mass index is 28.05 kg/(m^2) as calculated from the following:    Height as of this encounter: 1.575 m (5' 2.01\").    Weight as of this encounter: 69.6 kg (153 lb 6.4 oz).. Body surface area is 1.74 meters squared. BP completed using cuff size: regular  Severe Pain (7) Patient's last menstrual period was 03/30/2017. Allergies and medications reviewed.     Medications: Medication refills not needed today.  Pharmacy name entered into abeo: [id number] - [address] [place] [facility]    Comments:     7 minutes for nursing intake (face to face time)   Cathie Flannery MA        "

## 2017-04-11 NOTE — MR AVS SNAPSHOT
After Visit Summary   4/11/2017    Alex Rivas    MRN: [id number]           Patient Information     Date Of Birth          1995        Visit Information        Provider Department      4/11/2017 2:00 PM Harinder Lassiter MD [facility]        Today's Diagnoses     Idiopathic mast cell activation syndrome    -  1       Follow-ups after your visit        Follow-up notes from your care team     Return in about 3 months (around 7/11/2017).      Your next 10 appointments already scheduled     Aug 11, 2017  8:40 AM CDT   (Arrive by 8:25 AM)   Return Visit with Debo Buchanan PA-C   [facility] ([facility])    [address] [place] [id number] [phone]            Nov 21, 2017  3:00 PM CST   (Arrive by 2:45 PM)   Return Visit with Harinder Lassiter MD   [facility] ([facility])    [address] [place] [id number] [phone]              Who to contact     If you have questions or need follow up information about today's clinic visit or your schedule please contact [facility] directly at [phone].  Normal or non-critical lab and imaging results will be communicated to you by Embarklyhart, letter or phone within 4 business days after the clinic has received the results. If you do not hear from us within 7 days, please contact the clinic through Embarklyhart or phone. If you have a critical or abnormal lab result, we will notify you by phone as soon as possible.  Submit refill requests through mii or call your pharmacy and they will forward the refill request to us. Please allow 3 business days for your refill to be completed.          Additional Information About Your Visit        Embarklyhart Information     mii gives you secure access to your electronic health record. If you see a primary care provider, you can  "also send messages to your care team and make appointments. If you have questions, please call your primary care clinic.  If you do not have a primary care provider, please call [phone] and they will assist you.        Care EveryWhere ID     This is your Care EveryWhere ID. This could be used by other organizations to access your [place] medical records  [id number]        Your Vitals Were     Pulse Temperature Respirations Height Last Period Pulse Oximetry    79 96.7  F (35.9  C) (Oral) 18 1.575 m (5' 2.01\") 03/30/2017 100%    BMI (Body Mass Index)                   28.05 kg/m2            Blood Pressure from Last 3 Encounters:   04/11/17 115/79   01/06/17 120/78   08/05/16 117/81    Weight from Last 3 Encounters:   04/11/17 69.6 kg (153 lb 6.4 oz)   01/06/17 66.8 kg (147 lb 3.2 oz)   08/05/16 66.6 kg (146 lb 13.2 oz)              Today, you had the following     No orders found for display       Primary Care Provider Office Phone # Fax #    Sanjuana Ball PA-C [phone]       [facility] [address] [facility] [address] [facility] [address]  [facility] [address]        Thank you!     Thank you for choosing [facility]  for your care. Our goal is always to provide you with excellent care. Hearing back from our patients is one way we can continue to improve our services. Please take a few minutes to complete the written survey that you may receive in the mail after your visit with us. Thank you!             Your Updated Medication List - Protect others around you: Learn how to safely use, store and throw away your medicines at www.disposemymeds.org.          This list is accurate as of: 4/11/17 11:55 PM.  Always use your most recent med list.                   Brand Name Dispense Instructions for use    ADDERALL PO      Take 10 mg by mouth daily as needed       ALLEGRA PO      Take by mouth daily       AMOXICILLIN PO      Take by mouth 2 times daily 4 days left as of 4/11/17       BENADRYL PO     "  Take 25 mg by mouth daily       clindamycin 1 % solution    CLEOCIN T     Apply topically 2 times daily       Methylcobalamin 1000 MCG Subl      Place 2,500 mcg under the tongue daily       Multi-vitamin Tabs tablet      Take 1 tablet by mouth daily       OMEGA-3 FISH OIL PO      Take 1 g by mouth daily       UNABLE TO FIND      Take 60 Units by mouth daily MEDICATION NAME: Ultra Dorene Ib       VITAMIN C PO      Take 1,000 mg by mouth       VITAMIN D (CHOLECALCIFEROL) PO      Take 20,000 Units by mouth daily       ZANTAC PO      Take 150 mg by mouth 2 times daily

## 2017-04-11 NOTE — Clinical Note
Patient waiting.  RTC ~3 months to see YURIDIA Church (no labs), also RTC ~6-8 months to see me (no labs).

## 2017-10-03 ENCOUNTER — OFFICE VISIT (OUTPATIENT)
Dept: FAMILY MEDICINE | Facility: CLINIC | Age: 22
End: 2017-10-03
Payer: COMMERCIAL

## 2017-10-03 ENCOUNTER — RADIANT APPOINTMENT (OUTPATIENT)
Dept: GENERAL RADIOLOGY | Facility: CLINIC | Age: 22
End: 2017-10-03
Attending: NURSE PRACTITIONER
Payer: COMMERCIAL

## 2017-10-03 VITALS
WEIGHT: 158 LBS | HEART RATE: 105 BPM | DIASTOLIC BLOOD PRESSURE: 76 MMHG | BODY MASS INDEX: 29.08 KG/M2 | HEIGHT: 62 IN | SYSTOLIC BLOOD PRESSURE: 112 MMHG | TEMPERATURE: 98.6 F | OXYGEN SATURATION: 96 %

## 2017-10-03 DIAGNOSIS — D72.828 NEUTROPHILIA: ICD-10-CM

## 2017-10-03 DIAGNOSIS — R05.9 COUGH: ICD-10-CM

## 2017-10-03 DIAGNOSIS — D89.42 IDIOPATHIC MAST CELL ACTIVATION SYNDROME (H): Chronic | ICD-10-CM

## 2017-10-03 DIAGNOSIS — J18.9 LINGULAR PNEUMONIA: ICD-10-CM

## 2017-10-03 DIAGNOSIS — R05.9 COUGH: Primary | ICD-10-CM

## 2017-10-03 LAB
BASOPHILS # BLD AUTO: 0 10E9/L (ref 0–0.2)
BASOPHILS NFR BLD AUTO: 0.1 %
DIFFERENTIAL METHOD BLD: ABNORMAL
EOSINOPHIL # BLD AUTO: 0.3 10E9/L (ref 0–0.7)
EOSINOPHIL NFR BLD AUTO: 2.2 %
ERYTHROCYTE [DISTWIDTH] IN BLOOD BY AUTOMATED COUNT: 12.3 % (ref 10–15)
HCT VFR BLD AUTO: 39.2 % (ref 35–47)
HGB BLD-MCNC: 12.8 G/DL (ref 11.7–15.7)
LYMPHOCYTES # BLD AUTO: 2 10E9/L (ref 0.8–5.3)
LYMPHOCYTES NFR BLD AUTO: 14.7 %
MCH RBC QN AUTO: 31.8 PG (ref 26.5–33)
MCHC RBC AUTO-ENTMCNC: 32.7 G/DL (ref 31.5–36.5)
MCV RBC AUTO: 98 FL (ref 78–100)
MONOCYTES # BLD AUTO: 1 10E9/L (ref 0–1.3)
MONOCYTES NFR BLD AUTO: 7.2 %
NEUTROPHILS # BLD AUTO: 10.4 10E9/L (ref 1.6–8.3)
NEUTROPHILS NFR BLD AUTO: 75.8 %
PLATELET # BLD AUTO: 262 10E9/L (ref 150–450)
RBC # BLD AUTO: 4.02 10E12/L (ref 3.8–5.2)
WBC # BLD AUTO: 13.7 10E9/L (ref 4–11)

## 2017-10-03 PROCEDURE — 99214 OFFICE O/P EST MOD 30 MIN: CPT | Performed by: NURSE PRACTITIONER

## 2017-10-03 PROCEDURE — 85025 COMPLETE CBC W/AUTO DIFF WBC: CPT | Performed by: NURSE PRACTITIONER

## 2017-10-03 PROCEDURE — 71020 XR CHEST 2 VW: CPT

## 2017-10-03 PROCEDURE — 36415 COLL VENOUS BLD VENIPUNCTURE: CPT | Performed by: NURSE PRACTITIONER

## 2017-10-03 RX ORDER — AZITHROMYCIN 250 MG/1
TABLET, FILM COATED ORAL
Qty: 6 TABLET | Refills: 0 | Status: SHIPPED | OUTPATIENT
Start: 2017-10-03 | End: 2017-10-31

## 2017-10-03 NOTE — PATIENT INSTRUCTIONS
Fluid push  Plain mucinex 1200mg twice a day  Take the antibiotic as prescribed  Follow up with Sanjuana next week

## 2017-10-03 NOTE — MR AVS SNAPSHOT
After Visit Summary   10/3/2017    Alex Rivas    MRN: [id number]           Patient Information     Date Of Birth          1995        Visit Information        Provider Department      10/3/2017 2:30 PM Mcihelle Kendall APRN CNP [facility]        Today's Diagnoses     Cough    -  1    Neutrophilia        Idiopathic mast cell activation syndrome (H)        Lingular pneumonia          Care Instructions    Fluid push  Plain mucinex 1200mg twice a day  Take the antibiotic as prescribed  Follow up with Sanjuana next week          Follow-ups after your visit        Who to contact     If you have questions or need follow up information about today's clinic visit or your schedule please contact [facility] directly at [phone].  Normal or non-critical lab and imaging results will be communicated to you by SphereUphart, letter or phone within 4 business days after the clinic has received the results. If you do not hear from us within 7 days, please contact the clinic through SphereUphart or phone. If you have a critical or abnormal lab result, we will notify you by phone as soon as possible.  Submit refill requests through UPlanMe or call your pharmacy and they will forward the refill request to us. Please allow 3 business days for your refill to be completed.          Additional Information About Your Visit        MyChart Information     UPlanMe gives you secure access to your electronic health record. If you see a primary care provider, you can also send messages to your care team and make appointments. If you have questions, please call your primary care clinic.  If you do not have a primary care provider, please call [phone] and they will assist you.        Care EveryWhere ID     This is your Care EveryWhere ID. This could be used by other organizations to access your [place] medical records  [id number]        Your Vitals Were     Pulse Temperature Height Pulse Oximetry  "Breastfeeding? BMI (Body Mass Index)    105 98.6  F (37  C) (Tympanic) 5' 2.1\" (1.577 m) 96% No 28.81 kg/m2       Blood Pressure from Last 3 Encounters:   10/03/17 112/76   04/11/17 115/79   01/06/17 120/78    Weight from Last 3 Encounters:   10/03/17 158 lb (71.7 kg)   04/11/17 153 lb 6.4 oz (69.6 kg)   01/06/17 147 lb 3.2 oz (66.8 kg)              We Performed the Following     CBC with platelets differential          Today's Medication Changes          These changes are accurate as of: 10/3/17  4:04 PM.  If you have any questions, ask your nurse or doctor.               Start taking these medicines.        Dose/Directions    azithromycin 250 MG tablet   Commonly known as:  ZITHROMAX   Used for:  Lingular pneumonia   Started by:  Michelle Kendall APRN CNP        Two tablets first day, then one tablet daily for four days.   Quantity:  6 tablet   Refills:  0            Where to get your medicines      These medications were sent to [facility] [address] [facility] - [place], [place] - [address] [facility]  [address], [place] [address]     Phone:  [phone]     azithromycin 250 MG tablet                Primary Care Provider Office Phone # Fax #    Sanjuana Ball PA-C [phone] [address] [address]        Equal Access to Services     [facility] ISAURO AH: Hadii becca ku hadasho Sostephen, waaxda luqadaha, qaybta kaalmada adeegyada, melvina richards. So [facility] [phone].    ATENCIÓN: Si habla español, tiene a castañeda disposición servicios gratuitos de asistencia lingüística. Llame al [phone].    We comply with applicable federal civil rights laws and [place] laws. We do not discriminate on the basis of race, color, national origin, age, disability, sex, sexual orientation, or gender identity.            Thank you!     Thank you for choosing [facility]  for your care. Our goal is always to provide you with excellent care. Hearing back from our " patients is one way we can continue to improve our services. Please take a few minutes to complete the written survey that you may receive in the mail after your visit with us. Thank you!             Your Updated Medication List - Protect others around you: Learn how to safely use, store and throw away your medicines at www.disposemymeds.org.          This list is accurate as of: 10/3/17  4:04 PM.  Always use your most recent med list.                   Brand Name Dispense Instructions for use Diagnosis    azithromycin 250 MG tablet    ZITHROMAX    6 tablet    Two tablets first day, then one tablet daily for four days.    Lingular pneumonia       UNABLE TO FIND      MEDICATION NAME: Noel PATEL Detox        VITAMIN D (CHOLECALCIFEROL) PO      Take 20,000 Units by mouth daily

## 2017-10-03 NOTE — NURSING NOTE
"Chief Complaint   Patient presents with     URI       Initial /76 (BP Location: Right arm, Cuff Size: Adult Regular)  Pulse 105  Temp 98.6  F (37  C) (Tympanic)  Ht 5' 2.1\" (1.577 m)  Wt 158 lb (71.7 kg)  SpO2 96%  Breastfeeding? No  BMI 28.81 kg/m2 Estimated body mass index is 28.81 kg/(m^2) as calculated from the following:    Height as of this encounter: 5' 2.1\" (1.577 m).    Weight as of this encounter: 158 lb (71.7 kg).  Medication Reconciliation: complete Audra Whelan MA      "

## 2017-10-03 NOTE — PROGRESS NOTES
SUBJECTIVE:   Alex Rivas is a 22 year old female who presents to clinic today for the following health issues:    RESPIRATORY SYMPTOMS      Duration: 4 days    Description  nasal congestion, sore throat, facial pain/pressure, cough, wheezing, chills, ear pain both, headache, fatigue/malaise, hoarse voice, myalgias and stomach ache    Severity: severe    Accompanying signs and symptoms: None    History (predisposing factors):  history of recurrent otitis media    Precipitating or alleviating factors: None    Therapies tried and outcome:  rest and fluids oral decongestant supplement/natural treatments - sx still present  Also reports chronic lyme disease currently treated homeopathically  And Mast cell disorder not being treated    Problem list and histories reviewed & adjusted, as indicated.  Additional history: as documented    Patient Active Problem List   Diagnosis     Low back pain     CARDIOVASCULAR SCREENING; LDL GOAL LESS THAN 160     Migraine     Idiopathic mast cell activation syndrome (H)     Past Surgical History:   Procedure Laterality Date     Colonoscopy and EGD  2011    gastris, otherwise normal     wisdom teeth         Social History   Substance Use Topics     Smoking status: Never Smoker     Smokeless tobacco: Never Used     Alcohol use No     Family History   Problem Relation Age of Onset     Hypertension Mother      Breast Cancer Paternal Aunt      CANCER Paternal Grandmother          Current Outpatient Prescriptions   Medication Sig Dispense Refill     UNABLE TO FIND MEDICATION NAME: Noel PATEL Detox       VITAMIN D, CHOLECALCIFEROL, PO Take 20,000 Units by mouth daily        Allergies   Allergen Reactions     Gluten Meal Anaphylaxis     Other reaction(s): GI Upset     Milk Protein Extract GI Disturbance     Amoxicillin Diarrhea and Nausea and Vomiting         Reviewed and updated as needed this visit by clinical staff     Reviewed and updated as needed this visit by Provider      "    ROS:  Constitutional, HEENT, cardiovascular, pulmonary, gi and gu systems are negative, except as otherwise noted.      OBJECTIVE:   /76 (BP Location: Right arm, Cuff Size: Adult Regular)  Pulse 105  Temp 98.6  F (37  C) (Tympanic)  Ht 5' 2.1\" (1.577 m)  Wt 158 lb (71.7 kg)  SpO2 96%  Breastfeeding? No  BMI 28.81 kg/m2  Body mass index is 28.81 kg/(m^2).  GENERAL: healthy, alert and mild distress  EYES: Eyes grossly normal to inspection, PERRL and conjunctivae and sclerae normal  HENT: ear canals and TM's normal, nose and mouth without ulcers or lesions  NECK: no adenopathy, no asymmetry, masses, or scars and thyroid normal to palpation  RESP: lungs clear to auscultation - no rales, rhonchi or wheezes  CV: regular rate and rhythm, normal S1 S2, no S3 or S4, no murmur, click or rub, no peripheral edema   MS: no gross musculoskeletal defects noted, no edema    Diagnostic Test Results:   Results for orders placed or performed in visit on 10/03/17 (from the past 24 hour(s))   CBC with platelets differential   Result Value Ref Range    WBC 13.7 (H) 4.0 - 11.0 10e9/L    RBC Count 4.02 3.8 - 5.2 10e12/L    Hemoglobin 12.8 11.7 - 15.7 g/dL    Hematocrit 39.2 35.0 - 47.0 %    MCV 98 78 - 100 fl    MCH 31.8 26.5 - 33.0 pg    MCHC 32.7 31.5 - 36.5 g/dL    RDW 12.3 10.0 - 15.0 %    Platelet Count 262 150 - 450 10e9/L    Diff Method Automated Method     % Neutrophils 75.8 %    % Lymphocytes 14.7 %    % Monocytes 7.2 %    % Eosinophils 2.2 %    % Basophils 0.1 %    Absolute Neutrophil 10.4 (H) 1.6 - 8.3 10e9/L    Absolute Lymphocytes 2.0 0.8 - 5.3 10e9/L    Absolute Monocytes 1.0 0.0 - 1.3 10e9/L    Absolute Eosinophils 0.3 0.0 - 0.7 10e9/L    Absolute Basophils 0.0 0.0 - 0.2 10e9/L   chest xray: lingular pneumonia    ASSESSMENT/PLAN:         ICD-10-CM    1. Cough R05 CBC with platelets differential     XR Chest 2 Views   2. Neutrophilia D72.9    3. Idiopathic mast cell activation syndrome (H) D89.42    4. " Lingular pneumonia J18.9 azithromycin (ZITHROMAX) 250 MG tablet       Patient Instructions   Fluid push  Plain mucinex 1200mg twice a day  Take the antibiotic as prescribed  Follow up with Sanjuana next week  Tylenol for comfort    ALL [facility]

## 2017-10-31 ENCOUNTER — OFFICE VISIT (OUTPATIENT)
Dept: FAMILY MEDICINE | Facility: CLINIC | Age: 22
End: 2017-10-31
Payer: COMMERCIAL

## 2017-10-31 VITALS
HEIGHT: 62 IN | BODY MASS INDEX: 29.44 KG/M2 | DIASTOLIC BLOOD PRESSURE: 80 MMHG | WEIGHT: 160 LBS | OXYGEN SATURATION: 99 % | TEMPERATURE: 97.9 F | HEART RATE: 79 BPM | SYSTOLIC BLOOD PRESSURE: 112 MMHG

## 2017-10-31 DIAGNOSIS — F41.9 ANXIETY: ICD-10-CM

## 2017-10-31 DIAGNOSIS — R00.2 PALPITATIONS: Primary | ICD-10-CM

## 2017-10-31 DIAGNOSIS — A69.20 LYME DISEASE: ICD-10-CM

## 2017-10-31 PROCEDURE — 93000 ELECTROCARDIOGRAM COMPLETE: CPT | Performed by: PHYSICIAN ASSISTANT

## 2017-10-31 PROCEDURE — 36415 COLL VENOUS BLD VENIPUNCTURE: CPT | Performed by: PHYSICIAN ASSISTANT

## 2017-10-31 PROCEDURE — 84443 ASSAY THYROID STIM HORMONE: CPT | Performed by: PHYSICIAN ASSISTANT

## 2017-10-31 PROCEDURE — 99214 OFFICE O/P EST MOD 30 MIN: CPT | Performed by: PHYSICIAN ASSISTANT

## 2017-10-31 NOTE — MR AVS SNAPSHOT
After Visit Summary   10/31/2017    Alex Rivas    MRN: [id number]           Patient Information     Date Of Birth          1995        Visit Information        Provider Department      10/31/2017 11:30 AM Sanjuana Ball PA-C [facility]        Today's Diagnoses     Palpitations    -  1    Anxiety        Lyme disease           Follow-ups after your visit        Future tests that were ordered for you today     Open Future Orders        Priority Expected Expires Ordered    Echocardiogram Complete Routine  10/31/2018 10/31/2017    Holter Monitor 48 hour - Adult Routine  12/15/2017 10/31/2017            Who to contact     If you have questions or need follow up information about today's clinic visit or your schedule please contact [facility] directly at [phone].  Normal or non-critical lab and imaging results will be communicated to you by MyChart, letter or phone within 4 business days after the clinic has received the results. If you do not hear from us within 7 days, please contact the clinic through MyChart or phone. If you have a critical or abnormal lab result, we will notify you by phone as soon as possible.  Submit refill requests through World Reviewer or call your pharmacy and they will forward the refill request to us. Please allow 3 business days for your refill to be completed.          Additional Information About Your Visit        MyChart Information     World Reviewer gives you secure access to your electronic health record. If you see a primary care provider, you can also send messages to your care team and make appointments. If you have questions, please call your primary care clinic.  If you do not have a primary care provider, please call [phone] and they will assist you.        Care EveryWhere ID     This is your Care EveryWhere ID. This could be used by other organizations to access your [place] medical records  [id number]        Your Vitals Were      "Pulse Temperature Height Last Period Pulse Oximetry Breastfeeding?    79 97.9  F (36.6  C) (Oral) 5' 2\" (1.575 m) 10/23/2017 99% No    BMI (Body Mass Index)                   29.26 kg/m2            Blood Pressure from Last 3 Encounters:   10/31/17 112/80   10/03/17 112/76   04/11/17 115/79    Weight from Last 3 Encounters:   10/31/17 160 lb (72.6 kg)   10/03/17 158 lb (71.7 kg)   04/11/17 153 lb 6.4 oz (69.6 kg)              We Performed the Following     EKG 12-lead complete w/read - Clinics     TSH with free T4 reflex        Primary Care Provider Office Phone # Fax #    Sanjuana Ball PA-C [phone] [address]  [place] [address]        Equal Access to Services     YANIQUE BOX : Hadii aad ku hadasho Soyaniraali, waaxda luqadaha, qaybta kaalmada adeegyada, waxay yadira atwood . So [facility] [phone].    ATENCIÓN: Si habla español, tiene a castañeda disposición servicios gratuitos de asistencia lingüística. Rashiame al [phone].    We comply with applicable federal civil rights laws and [place] laws. We do not discriminate on the basis of race, color, national origin, age, disability, sex, sexual orientation, or gender identity.            Thank you!     Thank you for choosing [facility]  for your care. Our goal is always to provide you with excellent care. Hearing back from our patients is one way we can continue to improve our services. Please take a few minutes to complete the written survey that you may receive in the mail after your visit with us. Thank you!             Your Updated Medication List - Protect others around you: Learn how to safely use, store and throw away your medicines at www.disposemymeds.org.          This list is accurate as of: 10/31/17 12:03 PM.  Always use your most recent med list.                   Brand Name Dispense Instructions for use Diagnosis    BENADRYL PO      Take 37.5 capsules by mouth daily        * HERBALS      " Noel Morales        * HERBALS      Mitocore   2 caps twice daily        L-THEANINE PO      Take 600 mg by mouth daily        medical cannabis (Patient's own supply.  Not a prescription)      3 capsules daily (This is NOT a prescription, and does not certify that the patient has a qualifying medical condition for medical cannabis.  The purpose of this order is  to document that the patient reports taking medical cannabis.)        Turmeric Curcumin Caps      Take 2 capsules by mouth 2 times daily        UNABLE TO FIND      MEDICATION NAME: Noel PATEL Detox        VITAMIN D (CHOLECALCIFEROL) PO      Take 20,000 Units by mouth daily        * Notice:  This list has 2 medication(s) that are the same as other medications prescribed for you. Read the directions carefully, and ask your doctor or other care provider to review them with you.

## 2017-10-31 NOTE — NURSING NOTE
"Chief Complaint   Patient presents with     Orders     for a heart monitor due to having chronic Lymes and co-infection per Kiran       Initial /80 (Cuff Size: Adult Regular)  Pulse 79  Temp 97.9  F (36.6  C) (Oral)  Ht 5' 2\" (1.575 m)  Wt 160 lb (72.6 kg)  LMP 10/23/2017  SpO2 99%  Breastfeeding? No  BMI 29.26 kg/m2 Estimated body mass index is 29.26 kg/(m^2) as calculated from the following:    Height as of this encounter: 5' 2\" (1.575 m).    Weight as of this encounter: 160 lb (72.6 kg).  Medication Reconciliation: complete    "

## 2017-10-31 NOTE — PROGRESS NOTES
HPI: This is a 21 yo female with chronic lyme disease here with complaint of palpitations x 2 weeks  This is occurring daily and lasts all day  She is followed by Dr. Beck who is a homeopathic provider who follows the pt for chronic lymes  She sometimes has chest pain assoc with these  She has anxiety which has been worse the past month  Not currently having sxs but driving here today was feeling this sensation  She has one latte per day but tried to cut back on that but still having sxs  Sxs can occur at rest or climbing stairs or showering  Can have assoc sob with these sxs.  She does not exercise since told her to avoid that due to lymes  She denies hx of heart problems, HTN or high chol although these run in her family (see FH)  Has FH of hypothyroidism in sister.  She was seen for cough earlier this month and tx with zpack and sxs resolved.  Denies any sudafed or other stimulants  She has been on medical cannabis prescribed by Dr. Beck for 1-2 weeks.  She doesn't think that has helped with her pain and was having palpit before starting this.  She used to take Adderall but off of that for a year.    Past Medical History:   Diagnosis Date     Gluten intolerance      Migraine     Followed by [facility]     Scoliosis      Spondylolisthesis     chiro     Past Surgical History:   Procedure Laterality Date     Colonoscopy and EGD  2011    gastris, otherwise normal     wisdom teeth       Social History   Substance Use Topics     Smoking status: Never Smoker     Smokeless tobacco: Never Used     Alcohol use No     Current Outpatient Prescriptions   Medication Sig Dispense Refill     HERBALS Noel Morales       medical cannabis (Patient's own supply.  Not a prescription) 3 capsules daily (This is NOT a prescription, and does not certify that the patient has a qualifying medical condition for medical cannabis.  The purpose of this order is  to document that the patient reports taking medical cannabis.)        "DiphenhydrAMINE HCl (BENADRYL PO) Take 37.5 capsules by mouth daily       HERBALS Mitocore   2 caps twice daily       Misc Natural Products (TURMERIC CURCUMIN) CAPS Take 2 capsules by mouth 2 times daily       L-THEANINE PO Take 600 mg by mouth daily       UNABLE TO FIND MEDICATION NAME: Noel Kyle NT Detox       VITAMIN D, CHOLECALCIFEROL, PO Take 20,000 Units by mouth daily        Allergies   Allergen Reactions     Gluten Meal Anaphylaxis     Other reaction(s): GI Upset     Milk Protein Extract GI Disturbance     Amoxicillin Diarrhea and Nausea and Vomiting     FAMILY HISTORY NOTED AND REVIEWED    PHYSICAL EXAM:    /80 (Cuff Size: Adult Regular)  Pulse 79  Temp 97.9  F (36.6  C) (Oral)  Ht 5' 2\" (1.575 m)  Wt 160 lb (72.6 kg)  LMP 10/23/2017  SpO2 99%  Breastfeeding? No  BMI 29.26 kg/m2    Patient appears non toxic    Heart: RRR without m/r/g  Lungs: CTA bilat    Assessment and Plan:     (R00.2) Palpitations  (primary encounter diagnosis)  Comment:   Plan: TSH with free T4 reflex, Holter Monitor 48 hour        - Adult, Echocardiogram Complete, EKG 12-lead         complete w/read - Clinics            (F41.9) Anxiety  Comment:   Plan: anxiety may be increasing palpit.  Consider SSRI pending echo and holter.    (A69.20) Lyme disease  Comment:   Plan: pt states her chronic lymes managed by Dr. Beck (holistic provider). He recd she come in today for work up to r/o pericarditis.  This seems unlikely has pt not having chest pain, fever etc.      Sanjuana Ball PA-C      "

## 2017-11-01 LAB — TSH SERPL DL<=0.005 MIU/L-ACNC: 2.22 MU/L (ref 0.4–4)

## 2017-11-07 ENCOUNTER — HOSPITAL ENCOUNTER (OUTPATIENT)
Dept: CARDIOLOGY | Facility: CLINIC | Age: 22
Discharge: HOME | End: 2017-11-07
Attending: PHYSICIAN ASSISTANT | Admitting: PHYSICIAN ASSISTANT
Payer: COMMERCIAL

## 2017-11-07 ENCOUNTER — HOSPITAL ENCOUNTER (OUTPATIENT)
Dept: CARDIOLOGY | Facility: CLINIC | Age: 22
End: 2017-11-07
Attending: PHYSICIAN ASSISTANT
Payer: COMMERCIAL

## 2017-11-07 DIAGNOSIS — R00.2 PALPITATIONS: ICD-10-CM

## 2017-11-07 PROCEDURE — 93227 XTRNL ECG REC<48 HR R&I: CPT | Performed by: INTERNAL MEDICINE

## 2017-11-07 PROCEDURE — 93306 TTE W/DOPPLER COMPLETE: CPT | Mod: 26 | Performed by: INTERNAL MEDICINE

## 2017-11-07 PROCEDURE — 93306 TTE W/DOPPLER COMPLETE: CPT

## 2017-11-07 PROCEDURE — 93226 XTRNL ECG REC<48 HR SCAN A/R: CPT

## 2017-11-15 ENCOUNTER — TELEPHONE (OUTPATIENT)
Dept: FAMILY MEDICINE | Facility: CLINIC | Age: 22
End: 2017-11-15

## 2017-11-15 DIAGNOSIS — I44.1 HEART BLOCK AV SECOND DEGREE: Primary | ICD-10-CM

## 2017-11-15 NOTE — TELEPHONE ENCOUNTER
Clinic Action Needed:Yes  FNA Triage Call  Presenting Problem:    Patient returning PCP call regarding Holter monitor results.    Would like a call back tomorrow (11/16) at [phone].    Routed to:Sanjuana Vance RN/ [facility]

## 2017-11-15 NOTE — PROGRESS NOTES
Referred to cards for heart block in setting of chronic lymes  LM for pt to call me so we can discuss holter.

## 2017-11-16 NOTE — TELEPHONE ENCOUNTER
Reason for Call:  Other call back    Detailed comments: patient just talked to Sanjuana Ball and is asking for a call back again regarding test results, she has more information to add.     Phone Number Patient can be reached at: Cell number on file:    Telephone Information:   Mobile [phone]       Best Time: any    Can we leave a detailed message on this number? YES    Call taken on 11/16/2017 at 8:58 AM by Maria G Tolentino

## 2017-11-17 PROBLEM — R00.2 PALPITATIONS: Status: ACTIVE | Noted: 2017-11-17

## 2017-11-22 ENCOUNTER — OFFICE VISIT (OUTPATIENT)
Dept: CARDIOLOGY | Facility: CLINIC | Age: 22
End: 2017-11-22
Attending: PHYSICIAN ASSISTANT
Payer: COMMERCIAL

## 2017-11-22 VITALS
HEART RATE: 98 BPM | HEIGHT: 62 IN | BODY MASS INDEX: 30.73 KG/M2 | SYSTOLIC BLOOD PRESSURE: 124 MMHG | DIASTOLIC BLOOD PRESSURE: 80 MMHG | WEIGHT: 167 LBS | OXYGEN SATURATION: 100 %

## 2017-11-22 DIAGNOSIS — R00.2 PALPITATIONS: Primary | ICD-10-CM

## 2017-11-22 PROCEDURE — 99204 OFFICE O/P NEW MOD 45 MIN: CPT | Performed by: INTERNAL MEDICINE

## 2017-11-22 RX ORDER — QUININE SULFATE 324 MG/1
CAPSULE ORAL
Refills: 0 | COMMUNITY
Start: 2017-11-16 | End: 2019-11-07

## 2017-11-22 RX ORDER — CLINDAMYCIN HCL 300 MG
CAPSULE ORAL
Refills: 0 | COMMUNITY
Start: 2017-11-16 | End: 2019-11-07

## 2017-11-22 NOTE — PROGRESS NOTES
HPI and Plan:   See dictation    No orders of the defined types were placed in this encounter.      Orders Placed This Encounter   Medications     clindamycin (CLEOCIN) 300 MG capsule     Sig: TAKE 2 CAPSULES BY MOUTH EVERY 8 HOURS FOR 14 DAYS     Refill:  0     quiNINE 324 MG capsule     Sig: TAKE 2 CAPSULES BY MOUTH EVERY 8 HOURS FOR 14 DAYS     Refill:  0       There are no discontinued medications.      Encounter Diagnosis   Name Primary?     Palpitations Yes       CURRENT MEDICATIONS:  Current Outpatient Prescriptions   Medication Sig Dispense Refill     clindamycin (CLEOCIN) 300 MG capsule TAKE 2 CAPSULES BY MOUTH EVERY 8 HOURS FOR 14 DAYS  0     quiNINE 324 MG capsule TAKE 2 CAPSULES BY MOUTH EVERY 8 HOURS FOR 14 DAYS  0     medical cannabis (Patient's own supply.  Not a prescription) 3 capsules daily (This is NOT a prescription, and does not certify that the patient has a qualifying medical condition for medical cannabis.  The purpose of this order is  to document that the patient reports taking medical cannabis.)       HERBALS Mitocore   2 caps twice daily       Misc Natural Products (TURMERIC CURCUMIN) CAPS Take 2 capsules by mouth 2 times daily       L-THEANINE PO Take 600 mg by mouth daily       UNABLE TO FIND MEDICATION NAME: Noel PATEL Detox       VITAMIN D, CHOLECALCIFEROL, PO Take 20,000 Units by mouth daily        HERBALS Noel CULP Andrew       DiphenhydrAMINE HCl (BENADRYL PO) Take 37.5 capsules by mouth daily         ALLERGIES     Allergies   Allergen Reactions     Gluten Meal Anaphylaxis     Other reaction(s): GI Upset     Milk Protein Extract GI Disturbance     Amoxicillin Diarrhea and Nausea and Vomiting       PAST MEDICAL HISTORY:  Past Medical History:   Diagnosis Date     Gluten intolerance      Lyme disease      Migraine     Followed by [facility]     Scoliosis      Spondylolisthesis     chiro     Nayla second degree AV block     during sleep       PAST SURGICAL HISTORY:  Past  "Surgical History:   Procedure Laterality Date     Colonoscopy and EGD  2011    gastris, otherwise normal     wisdom teeth         FAMILY HISTORY:  Family History   Problem Relation Age of Onset     Hypertension Mother      Breast Cancer Paternal Aunt      CANCER Paternal Grandmother      HEART DISEASE Paternal Grandmother      Hypertension Father        SOCIAL HISTORY:  Social History     Social History     Marital status: Single     Spouse name: N/A     Number of children: N/A     Years of education: N/A     Social History Main Topics     Smoking status: Never Smoker     Smokeless tobacco: Never Used     Alcohol use No     Drug use: No     Sexual activity: No     Other Topics Concern     Not on file     Social History Narrative       Review of Systems:  Skin:  Negative       Eyes:  Negative      ENT:  Negative      Respiratory:  Positive for dyspnea on exertion     Cardiovascular:    Positive for;chest pain;palpitations;edema    Gastroenterology: Negative      Genitourinary:  Negative      Musculoskeletal:  Positive for back pain;neck pain    Neurologic:  Positive for numbness or tingling of hands;numbness or tingling of feet    Psychiatric:  Positive for excessive stress;anxiety;sleep disturbances    Heme/Lymph/Imm:  Negative      Endocrine:  Negative        Physical Exam:  Vitals: /80  Pulse 98  Ht 1.575 m (5' 2\")  Wt 75.8 kg (167 lb)  LMP 10/23/2017  SpO2 100%  BMI 30.54 kg/m2    Constitutional:  cooperative, alert and oriented, well developed, well nourished, in no acute distress        Skin:  warm and dry to the touch, no apparent skin lesions or masses noted          Head:  normocephalic, no masses or lesions        Eyes:  pupils equal and round, conjunctivae and lids unremarkable, sclera white, no xanthalasma, EOMS intact, no nystagmus        Lymph:No Cervical lymphadenopathy present     ENT:  no pallor or cyanosis, dentition good        Neck:  carotid pulses are full and equal bilaterally, JVP " normal, no carotid bruit        Respiratory:  normal breath sounds, clear to auscultation, normal A-P diameter, normal symmetry, normal respiratory excursion, no use of accessory muscles         Cardiac: regular rhythm, normal S1/S2, no S3 or S4, apical impulse not displaced, no murmurs, gallops or rubs                                                         GI:  abdomen soft, non-tender, BS normoactive, no mass, no HSM, no bruits        Extremities and Muscular Skeletal:  no deformities, clubbing, cyanosis, erythema observed              Neurological:  no gross motor deficits        Psych:  Alert and Oriented x 3        CC  Sanjuana Ball, PA-C  [address]  [place] [address]

## 2017-11-22 NOTE — PROGRESS NOTES
HISTORY OF PRESENT ILLNESS:  I saw Ms. Rivas for evaluation of second-degree AV block.  She is a 22-year-old white female who has been having fatigue, palpitation and joint pain for about 6 years.  She has been to [facility] for evaluation of possible Lyme disease.  She was evaluated by Infectious Disease and was told that she did not have Lyme disease; however, subsequently she was told that she did have a Lyme disease by a physician in [place] when she was attending school over there.  She is back to [place] and currently working part-time.      She has a diagnosis of migraine headache and gluten intolerance.  She recently has had palpitations and shortness of breath.  She was put on a 48-hour Holter monitor which detected Wenckebach AV block during the night.  I reviewed the tracings and agree with the diagnosis.  She pressed the event button 36 times for shortness of breath, palpitations and chest pain.  The EKG recording during those times showed sinus rhythm with a rate between  beats per minute.      She had an echocardiography on 11/07 that reported normal LV function with no other remarkable abnormalities.  Her TSH is normal.  There was also chest x-ray from 10/03 that did not show any remarkable abnormalities.      PHYSICAL EXAMINATION:   VITAL SIGNS:  Blood pressure was 124/80, heart rate 98 beats per minute, body weight up from 145 in 2014 to current to 167 pounds.   HEENT:  Eyes and ENT were unremarkable.   LUNGS:  Clear.   CARDIAC:  Rhythm was regular and heart sounds were normal with no murmur.   ABDOMEN:  Examination showed no hepatomegaly.   EXTREMITIES:  There was no pedal edema.      She is taking Cleocin, quinine and herbals of unknown brand name.  She is also taking medical cannabis and diphenhydramine.      ASSESSMENT AND RECOMMENDATIONS:  This is a 22-year-old white female who has a Wenckebach AV block during sleep.  She has no other bradyarrhythmia symptoms.  Wenckebach AV block  during sleep is usually considered a normal variant of a normal person's heart rhythm of her age and I do not recommend intervention.      Her symptoms of palpitation, chest pain and shortness of shortness of breath are not related to cardiac arrhythmia.  Based on the normal echocardiography, I do not think there is cardiac dysfunction either.  She is encouraged to improve her physical conditioning.  I suspect some of the medications may have side effects.  I would not encourage her to take too many medications at this point.  She does not require routine cardiology followup.      cc:      JAYSON Alvarez   [facility]   [address]   [place] [address]         LARA LYNCH MD             D: 2017 15:16   T: 2017 16:11   MT: MAMIE      Name:     MILTON CROWLEY   MRN:      [id number]        Account:      [id number]   :      1995           Service Date: 2017      Document: [id number]

## 2017-11-22 NOTE — LETTER
11/22/2017      Sanjuana Ball PA-C  [address]  [facility] [address]      RE: Alex Rivas       Dear Colleague,    I had the pleasure of seeing Alex Rivas in the [facility].    HISTORY OF PRESENT ILLNESS:  I saw Ms. Rivas for evaluation of second-degree AV block.  She is a 22-year-old white female who has been having fatigue, palpitation and joint pain for about 6 years.  She has been to [facility] for evaluation of possible Lyme disease.  She was evaluated by Infectious Disease and was told that she did not have Lyme disease; however, subsequently she was told that she did have a Lyme disease by a physician in [place] when she was attending school over there.  She is back to [place] and currently working part-time.      She has a diagnosis of migraine headache and gluten intolerance.  She recently has had palpitations and shortness of breath.  She was put on a 48-hour Holter monitor which detected Wenckebach AV block during the night.  I reviewed the tracings and agree with the diagnosis.  She pressed the event button 36 times for shortness of breath, palpitations and chest pain.  The EKG recording during those times showed sinus rhythm with a rate between  beats per minute.      She had an echocardiography on 11/07 that reported normal LV function with no other remarkable abnormalities.  Her TSH is normal.  There was also chest x-ray from 10/03 that did not show any remarkable abnormalities.      PHYSICAL EXAMINATION:   VITAL SIGNS:  Blood pressure was 124/80, heart rate 98 beats per minute, body weight up from 145 in 2014 to current to 167 pounds.   HEENT:  Eyes and ENT were unremarkable.   LUNGS:  Clear.   CARDIAC:  Rhythm was regular and heart sounds were normal with no murmur.   ABDOMEN:  Examination showed no hepatomegaly.   EXTREMITIES:  There was no pedal edema.      She is taking Cleocin, quinine and herbals of unknown brand name.  She is  also taking medical cannabis and diphenhydramine.      ASSESSMENT AND RECOMMENDATIONS:  This is a 22-year-old white female who has a Wenckebach AV block during sleep.  She has no other bradyarrhythmia symptoms.  Wenckebach AV block during sleep is usually considered a normal variant of a normal person's heart rhythm of her age and I do not recommend intervention.      Her symptoms of palpitation, chest pain and shortness of shortness of breath are not related to cardiac arrhythmia.  Based on the normal echocardiography, I do not think there is cardiac dysfunction either.  She is encouraged to improve her physical conditioning.  I suspect some of the medications may have side effects.  I would not encourage her to take too many medications at this point.  She does not require routine cardiology followup.     Outpatient Encounter Prescriptions as of 11/22/2017   Medication Sig Dispense Refill     clindamycin (CLEOCIN) 300 MG capsule TAKE 2 CAPSULES BY MOUTH EVERY 8 HOURS FOR 14 DAYS  0     quiNINE 324 MG capsule TAKE 2 CAPSULES BY MOUTH EVERY 8 HOURS FOR 14 DAYS  0     medical cannabis (Patient's own supply.  Not a prescription) 3 capsules daily (This is NOT a prescription, and does not certify that the patient has a qualifying medical condition for medical cannabis.  The purpose of this order is  to document that the patient reports taking medical cannabis.)       HERBALS Mitocore   2 caps twice daily       Misc Natural Products (TURMERIC CURCUMIN) CAPS Take 2 capsules by mouth 2 times daily       L-THEANINE PO Take 600 mg by mouth daily       UNABLE TO FIND MEDICATION NAME: Noel Kyle NT Detox       VITAMIN D, CHOLECALCIFEROL, PO Take 20,000 Units by mouth daily        HERBALS Noel CULP Andrew       DiphenhydrAMINE HCl (BENADRYL PO) Take 37.5 capsules by mouth daily       No facility-administered encounter medications on file as of 11/22/2017.      Again, thank you for allowing me to participate in the care of  your patient.      Sincerely,    Braxton Rubio MD     [facility]

## 2017-11-22 NOTE — MR AVS SNAPSHOT
"              After Visit Summary   11/22/2017    Alex Rivas    MRN: [id number]           Patient Information     Date Of Birth          1995        Visit Information        Provider Department      11/22/2017 2:45 PM Braxton Rubio MD [facility]        Today's Diagnoses     Palpitations    -  1       Follow-ups after your visit        Who to contact     If you have questions or need follow up information about today's clinic visit or your schedule please contact [facility] directly at [phone].  Normal or non-critical lab and imaging results will be communicated to you by Knokhart, letter or phone within 4 business days after the clinic has received the results. If you do not hear from us within 7 days, please contact the clinic through Aseptiat or phone. If you have a critical or abnormal lab result, we will notify you by phone as soon as possible.  Submit refill requests through [employer] or call your pharmacy and they will forward the refill request to us. Please allow 3 business days for your refill to be completed.          Additional Information About Your Visit        MyChart Information     [employer] gives you secure access to your electronic health record. If you see a primary care provider, you can also send messages to your care team and make appointments. If you have questions, please call your primary care clinic.  If you do not have a primary care provider, please call [phone] and they will assist you.        Care EveryWhere ID     This is your Care EveryWhere ID. This could be used by other organizations to access your [facility] medical records  [id number]        Your Vitals Were     Pulse Height Last Period Pulse Oximetry BMI (Body Mass Index)       98 1.575 m (5' 2\") 10/23/2017 100% 30.54 kg/m2        Blood Pressure from Last 3 Encounters:   11/22/17 124/80   10/31/17 112/80   10/03/17 112/76    Weight from " Last 3 Encounters:   11/22/17 75.8 kg (167 lb)   10/31/17 72.6 kg (160 lb)   10/03/17 71.7 kg (158 lb)              Today, you had the following     No orders found for display       Primary Care Provider Office Phone # Fax #    Sanjuana Ball PA-C [phone] [address] [facility] [address]  [place] [address]        Equal Access to Services     DEACON BOX : Hadii aad ku hadasho Soomaali, waaxda luqadaha, qaybta kaalmada adeegyada, waxay idiin hayaan adeeg kharash la'aan . So [facility] [phone].    ATENCIÓN: Si habla español, tiene a castañeda disposición servicios gratuitos de asistencia lingüística. Ll[facility] [phone].    We comply with applicable federal civil rights laws and [place] laws. We do not discriminate on the basis of race, color, national origin, age, disability, sex, sexual orientation, or gender identity.            Thank you!     Thank you for choosing [facility]  for your care. Our goal is always to provide you with excellent care. Hearing back from our patients is one way we can continue to improve our services. Please take a few minutes to complete the written survey that you may receive in the mail after your visit with us. Thank you!             Your Updated Medication List - Protect others around you: Learn how to safely use, store and throw away your medicines at www.disposemymeds.org.          This list is accurate as of: 11/22/17 11:59 PM.  Always use your most recent med list.                   Brand Name Dispense Instructions for use Diagnosis    BENADRYL PO      Take 37.5 capsules by mouth daily        clindamycin 300 MG capsule    CLEOCIN     TAKE 2 CAPSULES BY MOUTH EVERY 8 HOURS FOR 14 DAYS        * HERBALS      Noel Morales        * HERBALS      Mitocore   2 caps twice daily        L-THEANINE PO      Take 600 mg by mouth daily        medical cannabis (Patient's own supply.  Not a prescription)      3 capsules daily (This is  NOT a prescription, and does not certify that the patient has a qualifying medical condition for medical cannabis.  The purpose of this order is  to document that the patient reports taking medical cannabis.)        quiNINE 324 MG capsule      TAKE 2 CAPSULES BY MOUTH EVERY 8 HOURS FOR 14 DAYS        Turmeric Curcumin Caps      Take 2 capsules by mouth 2 times daily        UNABLE TO FIND      MEDICATION NAME: Noel PATEL Detox        VITAMIN D (CHOLECALCIFEROL) PO      Take 20,000 Units by mouth daily        * Notice:  This list has 2 medication(s) that are the same as other medications prescribed for you. Read the directions carefully, and ask your doctor or other care provider to review them with you.

## 2017-12-01 ENCOUNTER — TRANSFERRED RECORDS (OUTPATIENT)
Dept: HEALTH INFORMATION MANAGEMENT | Facility: CLINIC | Age: 22
End: 2017-12-01

## 2017-12-03 ENCOUNTER — HEALTH MAINTENANCE LETTER (OUTPATIENT)
Age: 22
End: 2017-12-03

## 2017-12-08 ENCOUNTER — CHARTING TRANS (OUTPATIENT)
Dept: OTHER | Age: 22
End: 2017-12-08

## 2018-01-30 ENCOUNTER — RECORDS - HEALTHEAST (OUTPATIENT)
Dept: ADMINISTRATIVE | Facility: OTHER | Age: 23
End: 2018-01-30

## 2018-02-06 ENCOUNTER — TRANSFERRED RECORDS (OUTPATIENT)
Dept: HEALTH INFORMATION MANAGEMENT | Facility: CLINIC | Age: 23
End: 2018-02-06

## 2018-02-06 ENCOUNTER — HOSPITAL ENCOUNTER (OUTPATIENT)
Dept: NEUROLOGY | Facility: HOSPITAL | Age: 23
Discharge: HOME | End: 2018-02-06

## 2018-02-06 DIAGNOSIS — G93.40 ENCEPHALOPATHY: ICD-10-CM

## 2018-02-16 ENCOUNTER — AMBULATORY - HEALTHEAST (OUTPATIENT)
Dept: LAB | Facility: CLINIC | Age: 23
End: 2018-02-16

## 2018-02-16 ENCOUNTER — COMMUNICATION - HEALTHEAST (OUTPATIENT)
Dept: TELEHEALTH | Facility: CLINIC | Age: 23
End: 2018-02-16

## 2018-02-16 DIAGNOSIS — G90.9 AUTONOMIC DYSFUNCTION: ICD-10-CM

## 2018-02-16 DIAGNOSIS — R20.0 LEFT SIDED NUMBNESS: ICD-10-CM

## 2018-02-16 DIAGNOSIS — M19.90 ARTHRITIS PAIN: ICD-10-CM

## 2018-02-16 LAB
C REACTIVE PROTEIN LHE: <0.1 MG/DL (ref 0–0.8)
CK SERPL-CCNC: 85 U/L (ref 30–190)
RHEUMATOID FACT SERPL-ACNC: <15 IU/ML (ref 0–30)
VIT B12 SERPL-MCNC: 304 PG/ML (ref 213–816)

## 2018-02-19 LAB
ANA SER QL: 0.3 U
CARDIOLIPIN IGA SER IA-ACNC: <0.5 APL U/ML (ref 0–19.9)
CARDIOLIPIN IGG SER IA-ACNC: <1.6 GPL-U/ML (ref 0–19.9)
CARDIOLIPIN IGM SER IA-ACNC: 0.9 MPL-U/ML (ref 0–19.9)
GLIADIN IGA SER-ACNC: 0.5 U/ML
GLIADIN IGG SER-ACNC: <0.4 U/ML

## 2018-02-20 LAB
SS-A/RO AUTOANTIBODIES - HISTORICAL: 1 EU
SS-B/LA AUTOANTIBODIES - HISTORICAL: 0 EU

## 2018-02-21 LAB — DRVVT, LUPUS ANTICOAGULANT - HISTORICAL: 40 SEC

## 2018-05-19 ENCOUNTER — TRANSFERRED RECORDS (OUTPATIENT)
Dept: HEALTH INFORMATION MANAGEMENT | Facility: CLINIC | Age: 23
End: 2018-05-19

## 2018-11-02 VITALS
RESPIRATION RATE: 18 BRPM | HEIGHT: 62 IN | HEART RATE: 104 BPM | OXYGEN SATURATION: 100 % | BODY MASS INDEX: 29.49 KG/M2 | TEMPERATURE: 98.8 F | WEIGHT: 160.27 LBS

## 2019-11-07 ENCOUNTER — OFFICE VISIT (OUTPATIENT)
Dept: FAMILY MEDICINE | Facility: CLINIC | Age: 24
End: 2019-11-07
Payer: COMMERCIAL

## 2019-11-07 VITALS
DIASTOLIC BLOOD PRESSURE: 77 MMHG | OXYGEN SATURATION: 100 % | SYSTOLIC BLOOD PRESSURE: 110 MMHG | WEIGHT: 150 LBS | HEIGHT: 62 IN | BODY MASS INDEX: 27.6 KG/M2 | TEMPERATURE: 98.1 F | HEART RATE: 86 BPM

## 2019-11-07 DIAGNOSIS — I88.9 AXILLARY LYMPHADENITIS: Primary | ICD-10-CM

## 2019-11-07 PROCEDURE — 99213 OFFICE O/P EST LOW 20 MIN: CPT | Performed by: INTERNAL MEDICINE

## 2019-11-07 RX ORDER — DISULFIRAM 250 MG/1
50 TABLET ORAL WEEKLY
Refills: 0 | COMMUNITY
Start: 2019-09-16 | End: 2022-03-28

## 2019-11-07 RX ORDER — PHENTERMINE HYDROCHLORIDE 15 MG/1
CAPSULE ORAL
Refills: 1 | COMMUNITY
Start: 2019-08-07 | End: 2020-02-11

## 2019-11-07 ASSESSMENT — MIFFLIN-ST. JEOR: SCORE: 1383.65

## 2019-11-07 NOTE — PATIENT INSTRUCTIONS
Patient Education     Lymphangitis  The lymphatic system is a part of the immune system. It helps fight against infection and inflammation. It consists of lymph glands (lymph nodes) throughout the body. Lymph channels carry lymph fluid from parts of the body to nearby lymph glands to be filtered.  Lymphangitis is an inflammation of lymph channels. It is caused by infected lymph fluid traveling from a site of infection. Symptoms are tender red streaks on the skin near the area of infection.  The condition can become very serious if not treated. The infection is treated with antibiotics or other medicines that treat the infection. If an abscess is present, it may be drained. When the site of infection is treated, the lymphangitis goes away.  Home care    Take all medicine to treat the infection exactly as prescribed until it is gone. Be very careful not to miss any doses. Don't stop taking the medicine until it is finished or your healthcare provider tells you to stop, even if you feel better.    Follow your healthcare provider's instructions for taking other medicines. Ask your healthcare provider before taking any over-the-counter medicines.    Make a warm compress by running hot water over a face cloth. Apply it to the sore area until the compress cools off. Repeat 3 times a day for the first 3 days. The heat will increase the blood flow to the area and speed the healing process. As an alternative, you can  the shower and direct the warm spray to the area.  Follow-up care  Follow up with your healthcare provider, or as directed.  When to seek medical advice  Call your healthcare provider if any of the following occur:    Increasing areas of redness or pain at the site of infection    Red streaks continue to grow    Pus or fluid draining from the lymph node    Fever of 100.4 F (38 C) or higher, or as directed by your healthcare provider.  Date Last Reviewed: 3/1/2018    3991-3659 [employer]  [address] [place] [address]. All rights reserved. This information is not intended as a substitute for professional medical care. Always follow your healthcare professional's instructions.

## 2019-11-07 NOTE — PROGRESS NOTES
"Subjective     Alex Rivas is a 24 year old female who presents to clinic today for the following health issues:    HPI     Painful lump under right armpit x 4 days. No noticeable redness or swelling.     Patient states that she has chronic Lyme disease  She shaves her armpits but does not remember any injury      Patient Active Problem List   Diagnosis     Low back pain     CARDIOVASCULAR SCREENING; LDL GOAL LESS THAN 160     Migraine     Idiopathic mast cell activation syndrome (H)     Anxiety     Lyme disease     Heart block AV second degree     Palpitations     Past Surgical History:   Procedure Laterality Date     Colonoscopy and EGD  2011    gastris, otherwise normal     wisdom teeth         Social History     Tobacco Use     Smoking status: Never Smoker     Smokeless tobacco: Never Used   Substance Use Topics     Alcohol use: No     Alcohol/week: 0.0 standard drinks     Family History   Problem Relation Age of Onset     Hypertension Mother      Breast Cancer Paternal Aunt      Cancer Paternal Grandmother      Heart Disease Paternal Grandmother      Hypertension Father          Current Outpatient Medications   Medication Sig Dispense Refill     disulfiram (ANTABUSE) 250 MG tablet Take 250 mg by mouth daily  0     phentermine (ADIPEX-P) 15 MG capsule TAKE 1 CAPSULE BY MOUTH EVERY DAY  1       Reviewed and updated as needed this visit by Provider         Review of Systems   10 point ROS of systems including Constitutional, Eyes, Respiratory, Cardiovascular, Gastroenterology, Genitourinary, Integumentary, Muscularskeletal, Psychiatric were all negative except for pertinent positives noted in my HPI.        Objective    /77 (BP Location: Left arm, Cuff Size: Adult Regular)   Pulse 86   Temp 98.1  F (36.7  C) (Oral)   Ht 1.575 m (5' 2\")   Wt 68 kg (150 lb)   SpO2 100%   Breastfeeding? No   BMI 27.44 kg/m    Body mass index is 27.44 kg/m .  Physical Exam   Left axillary area normal  Right anterior " "axillary small 3 x 3 mm lymph node which is mobile and nontender            Assessment & Plan     Axillary lymphadenitis  Smaller and nontender and reassured  Her potential causes are discussed  BMI:   Estimated body mass index is 27.44 kg/m  as calculated from the following:    Height as of this encounter: 1.575 m (5' 2\").    Weight as of this encounter: 68 kg (150 lb).               Return in about 6 weeks (around 12/19/2019) for pain and LN.    Rashard Lozano MD  [facility]    "

## 2020-02-11 ENCOUNTER — OFFICE VISIT (OUTPATIENT)
Dept: FAMILY MEDICINE | Facility: CLINIC | Age: 25
End: 2020-02-11
Payer: COMMERCIAL

## 2020-02-11 VITALS
DIASTOLIC BLOOD PRESSURE: 70 MMHG | SYSTOLIC BLOOD PRESSURE: 103 MMHG | BODY MASS INDEX: 28.34 KG/M2 | OXYGEN SATURATION: 100 % | WEIGHT: 154 LBS | TEMPERATURE: 98.3 F | HEIGHT: 62 IN | HEART RATE: 87 BPM

## 2020-02-11 DIAGNOSIS — D89.42 IDIOPATHIC MAST CELL ACTIVATION SYNDROME (H): ICD-10-CM

## 2020-02-11 DIAGNOSIS — R59.1 LYMPHADENOPATHY: ICD-10-CM

## 2020-02-11 DIAGNOSIS — R22.30 AXILLARY FULLNESS: Primary | ICD-10-CM

## 2020-02-11 DIAGNOSIS — Q83.1 AXILLARY ACCESSORY BREAST TISSUE: ICD-10-CM

## 2020-02-11 LAB
BASOPHILS # BLD AUTO: 0 10E9/L (ref 0–0.2)
BASOPHILS NFR BLD AUTO: 0.4 %
CRP SERPL-MCNC: <2.9 MG/L (ref 0–8)
DIFFERENTIAL METHOD BLD: NORMAL
EOSINOPHIL # BLD AUTO: 0.1 10E9/L (ref 0–0.7)
EOSINOPHIL NFR BLD AUTO: 1 %
ERYTHROCYTE [DISTWIDTH] IN BLOOD BY AUTOMATED COUNT: 12.3 % (ref 10–15)
HCT VFR BLD AUTO: 39.2 % (ref 35–47)
HGB BLD-MCNC: 13.1 G/DL (ref 11.7–15.7)
LYMPHOCYTES # BLD AUTO: 1.9 10E9/L (ref 0.8–5.3)
LYMPHOCYTES NFR BLD AUTO: 38.7 %
MCH RBC QN AUTO: 32.3 PG (ref 26.5–33)
MCHC RBC AUTO-ENTMCNC: 33.4 G/DL (ref 31.5–36.5)
MCV RBC AUTO: 97 FL (ref 78–100)
MONOCYTES # BLD AUTO: 0.3 10E9/L (ref 0–1.3)
MONOCYTES NFR BLD AUTO: 5.5 %
NEUTROPHILS # BLD AUTO: 2.7 10E9/L (ref 1.6–8.3)
NEUTROPHILS NFR BLD AUTO: 54.4 %
PLATELET # BLD AUTO: 301 10E9/L (ref 150–450)
RBC # BLD AUTO: 4.06 10E12/L (ref 3.8–5.2)
WBC # BLD AUTO: 4.9 10E9/L (ref 4–11)

## 2020-02-11 PROCEDURE — 86140 C-REACTIVE PROTEIN: CPT | Performed by: INTERNAL MEDICINE

## 2020-02-11 PROCEDURE — 85025 COMPLETE CBC W/AUTO DIFF WBC: CPT | Performed by: INTERNAL MEDICINE

## 2020-02-11 PROCEDURE — 36415 COLL VENOUS BLD VENIPUNCTURE: CPT | Performed by: INTERNAL MEDICINE

## 2020-02-11 PROCEDURE — 99214 OFFICE O/P EST MOD 30 MIN: CPT | Performed by: INTERNAL MEDICINE

## 2020-02-11 ASSESSMENT — MIFFLIN-ST. JEOR: SCORE: 1401.79

## 2020-02-11 NOTE — PATIENT INSTRUCTIONS
Labs today  Ultrasound of arm pit is ordered   Please call the following number to make appointment :  [phone]  It is located in [address]  Schedule physical with Sanjuana in 4-6 weeks  Seek sooner medical attention if there is any worsening of symptoms or problems.

## 2020-02-11 NOTE — PROGRESS NOTES
"Subjective     Alex Rivas is a 24 year old female who presents to clinic today for the following health issues:    HPI     Left Armpit  Left armpit usually swells up during her period but goes down after about two days  This month, it swelled up and has not gone down yet since Friday  It is now very itchy and painful  It worried her when the swelling did not go down  She has been watching it for a while  Family history of breast cancer in paternal aunt    Other diseases  She has chronic tick-born illnesses  Has lyme disease  Declined immunizations due to lyme's disease  Follows with an infectious disease who told her to hold getting immunizations at this time  Has mast cell disease  Has chronic olga barr virus       Medications and Labs reviewed in EPIC    Reviewed and updated as needed this visit by Provider         Review of Systems   ROS COMP: Constitutional, HEENT, cardiovascular, pulmonary, GI, , musculoskeletal, neuro, skin, endocrine and psych systems are negative, except as otherwise noted.    POSITIVE for left armpit swelling and itching    This document serves as a record of the services and decisions personally performed and made by Vita Bales MD. It was created on her behalf by Tammi Mayorga, a trained medical scribe. The creation of this document is based on the provider's statements to the medical scribe.  Tammi Mayorga 11:07 AM February 11, 2020        Objective    /70 (BP Location: Right arm, Patient Position: Chair, Cuff Size: Adult Large)   Pulse 87   Temp 98.3  F (36.8  C) (Oral)   Ht 1.575 m (5' 2\")   Wt 69.9 kg (154 lb)   SpO2 100%   Breastfeeding No   BMI 28.17 kg/m    Body mass index is 28.17 kg/m .  Physical Exam   GENERAL APPEARANCE: overweight, alert and no distress  EYES: Eyes grossly normal to inspection, PERRL and conjunctivae and sclerae normal  HENT: ear canals and TM's normal and nose and mouth without ulcers or lesions  NECK: no adenopathy  RESP: lungs " clear to auscultation - no rales, rhonchi or wheezes  CV: regular rates and rhythm, normal S1 S2, no S3  PSYCH: mentation appears normal, affect normal/bright  BREAST: Axillary breast tissue extending to left armpit area, 2-3 palpable lymph nodes noticed in the area      Diagnostic Test Results:  Labs reviewed in Epic  Results for orders placed or performed in visit on 02/11/20 (from the past 24 hour(s))   CBC with platelets and differential   Result Value Ref Range    WBC 4.9 4.0 - 11.0 10e9/L    RBC Count 4.06 3.8 - 5.2 10e12/L    Hemoglobin 13.1 11.7 - 15.7 g/dL    Hematocrit 39.2 35.0 - 47.0 %    MCV 97 78 - 100 fl    MCH 32.3 26.5 - 33.0 pg    MCHC 33.4 31.5 - 36.5 g/dL    RDW 12.3 10.0 - 15.0 %    Platelet Count 301 150 - 450 10e9/L    % Neutrophils 54.4 %    % Lymphocytes 38.7 %    % Monocytes 5.5 %    % Eosinophils 1.0 %    % Basophils 0.4 %    Absolute Neutrophil 2.7 1.6 - 8.3 10e9/L    Absolute Lymphocytes 1.9 0.8 - 5.3 10e9/L    Absolute Monocytes 0.3 0.0 - 1.3 10e9/L    Absolute Eosinophils 0.1 0.0 - 0.7 10e9/L    Absolute Basophils 0.0 0.0 - 0.2 10e9/L    Diff Method Automated Method    CRP inflammation   Result Value Ref Range    CRP Inflammation <2.9 0.0 - 8.0 mg/L             Assessment & Plan     Diagnoses and all orders for this visit:    Axillary fullness  -     CBC with platelets and differential  -     CRP inflammation  -     US Axillary Left; Future  She is here today for swollen left armpit  This usually happens around her period, but swelling goes down after 2 days  This time, she noticed the swelling did not go down and the area became itchy  It is now itchy and painful  The area is more prominent when pt is sitting upon exam  2-3 small lymph nodes were palpable in the swollen area upon exam  Informed her that this is axillary breast tissue that is extending  US of armpit was ordered for further testing  She will call and schedule  CBC was ordered to rule out any infection  We will call and  "let her know the results    Axillary accessory breast tissue  Comments:  left side   Orders:  -     US Axillary Left; Future  See Above    Lymphadenopathy  Comments:  axillary on left side  Orders:  -     CBC with platelets and differential  -     CRP inflammation  -     US Axillary Left; Future  See Above    Idiopathic mast cell activation syndrome (H)  Stable  Follows with infectious disease doctor       BMI:   Estimated body mass index is 28.17 kg/m  as calculated from the following:    Height as of this encounter: 1.575 m (5' 2\").    Weight as of this encounter: 69.9 kg (154 lb).           Patient Instructions   Labs today  Ultrasound of arm pit is ordered   Please call the following number to make appointment :  [phone]  It is located in [address]  Schedule physical with Sanjuana in 4-6 weeks  Seek sooner medical attention if there is any worsening of symptoms or problems.            The information in this document, created by the medical scribe for me, accurately reflects the services I personally performed and the decisions made by me. I have reviewed and approved this document for accuracy prior to leaving the patient care area.  February 11, 2020 11:33 AM    Vita Bales MD  [facility]          "

## 2020-02-12 NOTE — RESULT ENCOUNTER NOTE
Bubba Johnson,    This is to inform you regarding your test result.    C-reactive protein which is test to check for inflammation is normal  CBC result which includes white count Hemoglobin and  Platelet Counts is normal.         Sincerely,      Dr.Nasima Amairani MD,FACP

## 2020-02-13 ENCOUNTER — HOSPITAL ENCOUNTER (OUTPATIENT)
Dept: MAMMOGRAPHY | Facility: CLINIC | Age: 25
Discharge: HOME | End: 2020-02-13
Attending: INTERNAL MEDICINE | Admitting: INTERNAL MEDICINE
Payer: COMMERCIAL

## 2020-02-13 DIAGNOSIS — R22.30 AXILLARY FULLNESS: ICD-10-CM

## 2020-02-13 DIAGNOSIS — R59.1 LYMPHADENOPATHY: ICD-10-CM

## 2020-02-13 DIAGNOSIS — Q83.1 AXILLARY ACCESSORY BREAST TISSUE: ICD-10-CM

## 2020-02-13 PROCEDURE — 76882 US LMTD JT/FCL EVL NVASC XTR: CPT | Mod: LT

## 2020-02-14 NOTE — RESULT ENCOUNTER NOTE
Bubba Johnson,    This is to inform you regarding your test result.    Ultrasound result is normal .  Follow up with your primary provider    Sincerely,      Dr.Nasima Amairani MD,FACP

## 2020-03-02 ENCOUNTER — HEALTH MAINTENANCE LETTER (OUTPATIENT)
Age: 25
End: 2020-03-02

## 2020-08-14 ENCOUNTER — OFFICE VISIT (OUTPATIENT)
Dept: FAMILY MEDICINE | Facility: CLINIC | Age: 25
End: 2020-08-14
Payer: COMMERCIAL

## 2020-08-14 VITALS
HEIGHT: 62 IN | WEIGHT: 187 LBS | DIASTOLIC BLOOD PRESSURE: 86 MMHG | HEART RATE: 90 BPM | SYSTOLIC BLOOD PRESSURE: 123 MMHG | TEMPERATURE: 97.3 F | BODY MASS INDEX: 34.41 KG/M2 | OXYGEN SATURATION: 100 %

## 2020-08-14 DIAGNOSIS — K29.00 ACUTE GASTRITIS, PRESENCE OF BLEEDING UNSPECIFIED, UNSPECIFIED GASTRITIS TYPE: Primary | ICD-10-CM

## 2020-08-14 DIAGNOSIS — F41.9 ANXIETY: ICD-10-CM

## 2020-08-14 PROCEDURE — 99214 OFFICE O/P EST MOD 30 MIN: CPT | Performed by: INTERNAL MEDICINE

## 2020-08-14 ASSESSMENT — MIFFLIN-ST. JEOR: SCORE: 1546.48

## 2020-08-14 NOTE — PROGRESS NOTES
Subjective     Alex Rivas is a 25 year old female who presents to clinic today for the following health issues:    HPI       Post ER discharge follow up of epigastric abdominal pain      Duration: since July 30th    Description (location/character/radiation): near gallbladder per patient       Associated flank pain: yes    Intensity:  mild    Accompanying signs and symptoms:        Fever/Chills: no        Gas/Bloating: no        Nausea/vomitting: yes       Diarrhea: no        Dysuria or Hematuria: no     History (previous similar pain/trauma/previous testing): none    Precipitating or alleviating factors:       Pain worse with eating/BM/urination: eating       Pain relieved by BM: no     Therapies tried and outcome: None    LMP:  not applicable      Current Medications:     Current Outpatient Medications   Medication Sig Dispense Refill     disulfiram (ANTABUSE) 250 MG tablet Take 50 mg by mouth once a week   0     FLUoxetine (PROZAC) 20 MG capsule Take 20 mg by mouth daily Patient reports taking 30 mg       Lansoprazole (PREVACID PO)        Probiotic Product (PROBIOTIC DAILY PO)            Allergies:      Allergies   Allergen Reactions     Gluten Meal Anaphylaxis     Other reaction(s): GI Upset     Milk Protein Extract GI Disturbance     Amoxicillin Diarrhea and Nausea and Vomiting            Past Medical History:     Past Medical History:   Diagnosis Date     Gluten intolerance      Lyme disease      Migraine     Followed by [facility]     Scoliosis      Spondylolisthesis     evelyn Winslow second degree AV block     during sleep         Past Surgical History:     Past Surgical History:   Procedure Laterality Date     Colonoscopy and EGD  2011    gastris, otherwise normal     wisdom teeth           Family Medical History:     Family History   Problem Relation Age of Onset     Hypertension Mother      Breast Cancer Paternal Aunt      Cancer Paternal Grandmother      Heart Disease Paternal Grandmother       Hypertension Father          Social History:     Social History     Socioeconomic History     Marital status: Single     Spouse name: Not on file     Number of children: Not on file     Years of education: Not on file     Highest education level: Not on file   Occupational History     Not on file   Social Needs     Financial resource strain: Not on file     Food insecurity     Worry: Not on file     Inability: Not on file     Transportation needs     Medical: Not on file     Non-medical: Not on file   Tobacco Use     Smoking status: Never Smoker     Smokeless tobacco: Never Used   Substance and Sexual Activity     Alcohol use: No     Alcohol/week: 0.0 standard drinks     Drug use: No     Sexual activity: Never   Lifestyle     Physical activity     Days per week: Not on file     Minutes per session: Not on file     Stress: Not on file   Relationships     Social connections     Talks on phone: Not on file     Gets together: Not on file     Attends Hoahaoism service: Not on file     Active member of club or organization: Not on file     Attends meetings of clubs or organizations: Not on file     Relationship status: Not on file     Intimate partner violence     Fear of current or ex partner: Not on file     Emotionally abused: Not on file     Physically abused: Not on file     Forced sexual activity: Not on file   Other Topics Concern     Parent/sibling w/ CABG, MI or angioplasty before 65F 55M? Not Asked   Social History Narrative     Not on file           Review of System:     Constitutional: Negative for fever or chills  Skin: Negative for rashes  Ears/Nose/Throat: Negative for nasal congestion, sore throat  Respiratory: No shortness of breath, dyspnea on exertion, cough, or hemoptysis  Cardiovascular: Negative for chest pain  Gastrointestinal: Negative for nausea, vomiting, positive for poorly controlled gastritis, epigastric pain symptoms  Genitourinary: Negative for dysuria, hematuria  Musculoskeletal:  "Negative for myalgias  Neurologic: Negative for headaches  Psychiatric: positive for anxiety  Hematologic/Lymphatic/Immunologic: Negative  Endocrine: Negative  Behavioral: Negative for tobacco use       Physical Exam:   /86 (BP Location: Right arm, Patient Position: Sitting, Cuff Size: Adult Regular)   Pulse 90   Temp 97.3  F (36.3  C) (Temporal)   Ht 1.575 m (5' 2\")   Wt 84.8 kg (187 lb)   SpO2 100%   BMI 34.20 kg/m      GENERAL: alert and no distress  EYES: eyes grossly normal to inspection, and conjunctivae and sclerae normal  HENT: Normocephalic atraumatic. Nose and mouth without ulcers or lesions  NECK: supple  RESP: lungs clear to auscultation   CV: regular rate and rhythm, normal S1 S2  LYMPH: no peripheral edema   ABDOMEN: soft, positive bowel sounds throughout, non distended, mild epigastric tenderness to palpation noted, no signs of acute surgical abdomen with no signs of guarding or rebounding.  MS: no gross musculoskeletal defects noted  SKIN: no suspicious lesions or rashes  NEURO: Alert & Oriented x 3.   PSYCH: mentation appears normal, affect normal        Diagnostic Test Results:     Diagnostic Test Results:  Labs reviewed in [facility]      US ABDOMEN LIMITED RUQ8/7/2020  [facility]  Result Narrative   EXAM: US ABDOMEN LIMITED RUQ  LOCATION: [facility] [place]  DATE/TIME: 8/7/2020 5:40 PM    INDICATION: ABDOMINAL PAIN  COMPARISON: CT 01/11/2019  TECHNIQUE: Limited abdominal ultrasound.    FINDINGS:    GALLBLADDER: Normal. No gallstones, wall thickening, or pericholecystic fluid.  Negative sonographic Robles's sign.    BILE DUCTS: No biliary dilatation. The common duct measures 4 mm.    LIVER: Normal parenchyma with smooth contour. No focal mass.    RIGHT KIDNEY: No hydronephrosis.    PANCREAS: The visualized portions are normal.    No ascites.    IMPRESSION:  1.  Normal limited abdominal ultrasound.       ASSESSMENT/PLAN:       (K29.00) Acute gastritis, " presence of bleeding unspecified, unspecified gastritis type  (primary encounter diagnosis)  Comment: post ER discharge follow up of epigastric abdominal pains likely secondary to gastritis, GERD. patient's gastritis symptoms currently are not well controlled  Plan: I have ordered a diagnostic upper EGD endoscopy procedure with GASTROENTEROLOGY ADULT REF PROCEDURE ONLY for further evaluation.  Continue Prevacid GERD medication in the meantime.       (F41.9) Anxiety  Comment: symptoms stable.  Plan: continue Prozac.      Follow Up Plan:     Patient is instructed to return to Internal Medicine clinic for follow-up visit in 1 week        Florinda Manzano MD  Internal Medicine  [facility]

## 2020-08-20 ENCOUNTER — TELEPHONE (OUTPATIENT)
Dept: GASTROENTEROLOGY | Facility: CLINIC | Age: 25
End: 2020-08-20

## 2020-08-20 ENCOUNTER — HOSPITAL ENCOUNTER (OUTPATIENT)
Facility: AMBULATORY SURGERY CENTER | Age: 25
End: 2020-08-20
Attending: INTERNAL MEDICINE
Payer: COMMERCIAL

## 2020-08-20 DIAGNOSIS — Z11.59 ENCOUNTER FOR SCREENING FOR OTHER VIRAL DISEASES: Primary | ICD-10-CM

## 2020-08-20 NOTE — TELEPHONE ENCOUNTER
Patient is scheduled for EGD with Dr. Steel    Spoke with: patient    Date of Procedure: 9-3    Location: [facility]    Sedation Type - CS    Informed patient they will need an adult  - eys    Informed Patient of COVID Test Requirement - yes    Preferred Pharmacy for Pre Prescription - none    Confirmed Nurse will call to complete assessment - yes    Additional comments: none

## 2020-08-23 ENCOUNTER — HOSPITAL ENCOUNTER (EMERGENCY)
Facility: CLINIC | Age: 25
Discharge: HOME | End: 2020-08-24
Attending: EMERGENCY MEDICINE | Admitting: EMERGENCY MEDICINE
Payer: COMMERCIAL

## 2020-08-23 ENCOUNTER — APPOINTMENT (OUTPATIENT)
Dept: GENERAL RADIOLOGY | Facility: CLINIC | Age: 25
End: 2020-08-23
Attending: EMERGENCY MEDICINE
Payer: COMMERCIAL

## 2020-08-23 VITALS
RESPIRATION RATE: 18 BRPM | OXYGEN SATURATION: 99 % | DIASTOLIC BLOOD PRESSURE: 90 MMHG | SYSTOLIC BLOOD PRESSURE: 121 MMHG | TEMPERATURE: 99.2 F | HEART RATE: 82 BPM

## 2020-08-23 DIAGNOSIS — R10.11 RUQ ABDOMINAL PAIN: ICD-10-CM

## 2020-08-23 LAB
ALBUMIN SERPL-MCNC: 3.8 G/DL (ref 3.4–5)
ALBUMIN UR-MCNC: NEGATIVE MG/DL
ALP SERPL-CCNC: 61 U/L (ref 40–150)
ALT SERPL W P-5'-P-CCNC: 38 U/L (ref 0–50)
ANION GAP SERPL CALCULATED.3IONS-SCNC: 5 MMOL/L (ref 3–14)
APPEARANCE UR: CLEAR
AST SERPL W P-5'-P-CCNC: 18 U/L (ref 0–45)
BACTERIA #/AREA URNS HPF: ABNORMAL /HPF
BASOPHILS # BLD AUTO: 0 10E9/L (ref 0–0.2)
BASOPHILS NFR BLD AUTO: 0.1 %
BILIRUB SERPL-MCNC: 0.2 MG/DL (ref 0.2–1.3)
BILIRUB UR QL STRIP: NEGATIVE
BUN SERPL-MCNC: 12 MG/DL (ref 7–30)
CALCIUM SERPL-MCNC: 8.8 MG/DL (ref 8.5–10.1)
CHLORIDE SERPL-SCNC: 109 MMOL/L (ref 94–109)
CO2 SERPL-SCNC: 26 MMOL/L (ref 20–32)
COLOR UR AUTO: ABNORMAL
CREAT SERPL-MCNC: 0.93 MG/DL (ref 0.52–1.04)
DIFFERENTIAL METHOD BLD: NORMAL
EOSINOPHIL # BLD AUTO: 0.3 10E9/L (ref 0–0.7)
EOSINOPHIL NFR BLD AUTO: 4.1 %
ERYTHROCYTE [DISTWIDTH] IN BLOOD BY AUTOMATED COUNT: 12.6 % (ref 10–15)
GFR SERPL CREATININE-BSD FRML MDRD: 85 ML/MIN/{1.73_M2}
GLUCOSE SERPL-MCNC: 88 MG/DL (ref 70–99)
GLUCOSE UR STRIP-MCNC: NEGATIVE MG/DL
HCG SERPL QL: NEGATIVE
HCT VFR BLD AUTO: 37.4 % (ref 35–47)
HGB BLD-MCNC: 12.4 G/DL (ref 11.7–15.7)
HGB UR QL STRIP: NEGATIVE
IMM GRANULOCYTES # BLD: 0 10E9/L (ref 0–0.4)
IMM GRANULOCYTES NFR BLD: 0.3 %
KETONES UR STRIP-MCNC: NEGATIVE MG/DL
LEUKOCYTE ESTERASE UR QL STRIP: NEGATIVE
LIPASE SERPL-CCNC: 172 U/L (ref 73–393)
LYMPHOCYTES # BLD AUTO: 2.5 10E9/L (ref 0.8–5.3)
LYMPHOCYTES NFR BLD AUTO: 34.7 %
MCH RBC QN AUTO: 31.1 PG (ref 26.5–33)
MCHC RBC AUTO-ENTMCNC: 33.2 G/DL (ref 31.5–36.5)
MCV RBC AUTO: 94 FL (ref 78–100)
MONOCYTES # BLD AUTO: 0.5 10E9/L (ref 0–1.3)
MONOCYTES NFR BLD AUTO: 6.9 %
MUCOUS THREADS #/AREA URNS LPF: PRESENT /LPF
NEUTROPHILS # BLD AUTO: 3.9 10E9/L (ref 1.6–8.3)
NEUTROPHILS NFR BLD AUTO: 53.9 %
NITRATE UR QL: NEGATIVE
NRBC # BLD AUTO: 0 10*3/UL
NRBC BLD AUTO-RTO: 0 /100
PH UR STRIP: 7.5 PH (ref 5–7)
PLATELET # BLD AUTO: 305 10E9/L (ref 150–450)
POTASSIUM SERPL-SCNC: 3.7 MMOL/L (ref 3.4–5.3)
PROT SERPL-MCNC: 7.3 G/DL (ref 6.8–8.8)
RBC # BLD AUTO: 3.99 10E12/L (ref 3.8–5.2)
RBC #/AREA URNS AUTO: 1 /HPF (ref 0–2)
SODIUM SERPL-SCNC: 140 MMOL/L (ref 133–144)
SOURCE: ABNORMAL
SP GR UR STRIP: 1.01 (ref 1–1.03)
SQUAMOUS #/AREA URNS AUTO: <1 /HPF (ref 0–1)
UROBILINOGEN UR STRIP-MCNC: NORMAL MG/DL (ref 0–2)
WBC # BLD AUTO: 7.5 10E9/L (ref 4–11)
WBC #/AREA URNS AUTO: <1 /HPF (ref 0–5)

## 2020-08-23 PROCEDURE — 99285 EMERGENCY DEPT VISIT HI MDM: CPT | Mod: 25

## 2020-08-23 PROCEDURE — 74019 RADEX ABDOMEN 2 VIEWS: CPT

## 2020-08-23 PROCEDURE — 25000125 ZZHC RX 250: Performed by: EMERGENCY MEDICINE

## 2020-08-23 PROCEDURE — 25000132 ZZH RX MED GY IP 250 OP 250 PS 637: Performed by: EMERGENCY MEDICINE

## 2020-08-23 PROCEDURE — 76705 ECHO EXAM OF ABDOMEN: CPT

## 2020-08-23 PROCEDURE — 84703 CHORIONIC GONADOTROPIN ASSAY: CPT | Performed by: EMERGENCY MEDICINE

## 2020-08-23 PROCEDURE — 85025 COMPLETE CBC W/AUTO DIFF WBC: CPT | Performed by: EMERGENCY MEDICINE

## 2020-08-23 PROCEDURE — 81001 URINALYSIS AUTO W/SCOPE: CPT | Performed by: EMERGENCY MEDICINE

## 2020-08-23 PROCEDURE — 80053 COMPREHEN METABOLIC PANEL: CPT | Performed by: EMERGENCY MEDICINE

## 2020-08-23 PROCEDURE — 83690 ASSAY OF LIPASE: CPT | Performed by: EMERGENCY MEDICINE

## 2020-08-23 RX ORDER — TRAMADOL HYDROCHLORIDE 50 MG/1
100 TABLET ORAL ONCE
Status: COMPLETED | OUTPATIENT
Start: 2020-08-23 | End: 2020-08-23

## 2020-08-23 RX ORDER — ONDANSETRON 2 MG/ML
4 INJECTION INTRAMUSCULAR; INTRAVENOUS EVERY 30 MIN PRN
Status: DISCONTINUED | OUTPATIENT
Start: 2020-08-23 | End: 2020-08-24 | Stop reason: HOSPADM

## 2020-08-23 RX ORDER — CETIRIZINE HYDROCHLORIDE 5 MG/1
5 TABLET ORAL DAILY
COMMUNITY
End: 2022-03-28

## 2020-08-23 RX ORDER — SUCRALFATE ORAL 1 G/10ML
1 SUSPENSION ORAL ONCE
Status: COMPLETED | OUTPATIENT
Start: 2020-08-23 | End: 2020-08-23

## 2020-08-23 RX ORDER — DIPHENHYDRAMINE HCL 25 MG
50 CAPSULE ORAL AT BEDTIME
COMMUNITY

## 2020-08-23 RX ADMIN — TRAMADOL HYDROCHLORIDE 100 MG: 50 TABLET, FILM COATED ORAL at 22:35

## 2020-08-23 RX ADMIN — SUCRALFATE 1 G: 1 SUSPENSION ORAL at 21:38

## 2020-08-23 RX ADMIN — LIDOCAINE HYDROCHLORIDE 30 ML: 20 SOLUTION ORAL; TOPICAL at 21:37

## 2020-08-23 ASSESSMENT — ENCOUNTER SYMPTOMS
FLANK PAIN: 1
ABDOMINAL PAIN: 1
FEVER: 0
NAUSEA: 1
LIGHT-HEADEDNESS: 1

## 2020-08-23 NOTE — ED AVS SNAPSHOT
Emergency Department  [address] [place] [id number]  Phone:  [phone]  Fax:  [phone]                                    Alex Rivas   MRN: [id number]    Department:   Emergency Department   Date of Visit:  8/23/2020           After Visit Summary Signature Page    I have received my discharge instructions, and my questions have been answered. I have discussed any challenges I see with this plan with the nurse or doctor.    ..........................................................................................................................................  Patient/Patient Representative Signature      ..........................................................................................................................................  Patient Representative Print Name and Relationship to Patient    ..................................................               ................................................  Date                                   Time    ..........................................................................................................................................  Reviewed by Signature/Title    ...................................................              ..............................................  Date                                               Time          22EPIC Rev 08/18

## 2020-08-24 ENCOUNTER — ANCILLARY PROCEDURE (OUTPATIENT)
Dept: ULTRASOUND IMAGING | Facility: CLINIC | Age: 25
End: 2020-08-24
Attending: EMERGENCY MEDICINE
Payer: COMMERCIAL

## 2020-08-24 PROCEDURE — 25000132 ZZH RX MED GY IP 250 OP 250 PS 637: Performed by: EMERGENCY MEDICINE

## 2020-08-24 PROCEDURE — 25000128 H RX IP 250 OP 636: Performed by: EMERGENCY MEDICINE

## 2020-08-24 RX ORDER — PANTOPRAZOLE SODIUM 40 MG/1
40 TABLET, DELAYED RELEASE ORAL 2 TIMES DAILY
Qty: 28 TABLET | Refills: 0 | Status: SHIPPED | OUTPATIENT
Start: 2020-08-24 | End: 2020-09-07

## 2020-08-24 RX ORDER — HYDROCODONE BITARTRATE AND ACETAMINOPHEN 5; 325 MG/1; MG/1
1 TABLET ORAL EVERY 6 HOURS PRN
Qty: 10 TABLET | Refills: 0 | Status: SHIPPED | OUTPATIENT
Start: 2020-08-24 | End: 2020-08-27

## 2020-08-24 RX ORDER — POLYETHYLENE GLYCOL 3350 17 G/17G
1 POWDER, FOR SOLUTION ORAL DAILY
Qty: 527 G | Refills: 0 | Status: SHIPPED | OUTPATIENT
Start: 2020-08-24 | End: 2020-09-23

## 2020-08-24 RX ORDER — HYDROCODONE BITARTRATE AND ACETAMINOPHEN 5; 325 MG/1; MG/1
1 TABLET ORAL ONCE
Status: COMPLETED | OUTPATIENT
Start: 2020-08-24 | End: 2020-08-24

## 2020-08-24 RX ORDER — ONDANSETRON 4 MG/1
4 TABLET, ORALLY DISINTEGRATING ORAL ONCE
Status: COMPLETED | OUTPATIENT
Start: 2020-08-24 | End: 2020-08-24

## 2020-08-24 RX ORDER — ONDANSETRON 4 MG/1
4 TABLET, ORALLY DISINTEGRATING ORAL EVERY 6 HOURS PRN
Qty: 20 TABLET | Refills: 0 | Status: SHIPPED | OUTPATIENT
Start: 2020-08-24

## 2020-08-24 RX ADMIN — ONDANSETRON 4 MG: 4 TABLET, ORALLY DISINTEGRATING ORAL at 00:43

## 2020-08-24 RX ADMIN — HYDROCODONE BITARTRATE AND ACETAMINOPHEN 1 TABLET: 5; 325 TABLET ORAL at 00:43

## 2020-08-24 NOTE — ED TRIAGE NOTES
RUQ pain started 7/30, seen in UC 2 weeks ago and gallbladder US was negative, saw PCP 1 week ago and scheduled for endo next week, but pain is getting significantly worse after eating. Also weight gain of ~40 lbs in the last 6-8 months and chronic lymes disease

## 2020-08-24 NOTE — ED NOTES
Bed: ED25  Expected date:   Expected time:   Means of arrival:   Comments:  Triage dominic stewart

## 2020-08-24 NOTE — DISCHARGE INSTRUCTIONS
Please return to the emergency department as needed for new or worsening symptoms including severe uncontrollable abdominal pain, fever greater than 100.4  F, fainting, bloody bowel movements, vomiting blood, any other concerning symptoms.        Discharge Instructions  Abdominal Pain    Abdominal pain (belly pain) can be caused by many things. Your evaluation today does not show the exact cause for your pain. Your provider today has decided that it is unlikely your pain is due to a life threatening problem, or a problem requiring surgery or hospital admission. Sometimes those problems cannot be found right away, so it is very important that you follow up as directed.  Sometimes only the changes which occur over time allow the cause of your pain to be found.    Generally, every Emergency Department visit should have a follow-up clinic visit with either a primary or a specialty clinic/provider. Please follow-up as instructed by your emergency provider today. With abdominal pain, we often recommend very close follow-up, such as the following day.    ADULTS:  Return to the Emergency Department right away if:    You get an oral temperature above 102oF or as directed by your provider.  You have blood in your stools. This may be bright red or appear as black, tarry stools.    You keep vomiting (throwing up) or cannot drink liquids.  You see blood when you vomit.   You cannot have a bowel movement or you cannot pass gas.  Your stomach gets bloated or bigger.  Your skin or the whites of your eyes look yellow.  You faint.  You have bloody, frequent or painful urination (peeing).  You have new symptoms or anything that worries you.    CHILDREN:  Return to the Emergency Department right away if your child has any of the above-listed symptoms or the following:    Pushes your hand away or screams/cries when his/her belly is touched.  You notice your child is very fussy or weak.  Your child is very tired and is too tired to eat  or drink.  Your child is dehydrated.  Signs of dehydration can be:  Significant change in the amount of wet diapers/urine.  Your infant or child starts to have dry mouth and lips, or no saliva (spit) or tears.    PREGNANT WOMEN:  Return to the Emergency Department right away if you have any of the above-listed symptoms or the following:    You have bleeding, leaking fluid or passing tissue from the vagina.  You have worse pain or cramping, or pain in your shoulder or back.  You have vomiting that will not stop.  You have a temperature of 100oF or more.  Your baby is not moving as much as usual.  You faint.  You get a bad headache with or without eye problems and abdominal pain.  You have a seizure.  You have unusual discharge from your vagina and abdominal pain.    Abdominal pain is pretty common during pregnancy.  Your pain may or may not be related to your pregnancy. You should follow-up closely with your OB provider so they can evaluate you and your baby.  Until you follow-up with your regular provider, do the following:     Avoid sex and do not put anything in your vagina.  Drink clear fluids.  Only take medications approved by your provider.    MORE INFORMATION:    Appendicitis:  A possible cause of abdominal pain in any person who still has their appendix is acute appendicitis. Appendicitis is often hard to diagnose.  Testing does not always rule out early appendicitis or other causes of abdominal pain. Close follow-up with your provider and re-evaluations may be needed to figure out the reason for your abdominal pain.    Follow-up:  It is very important that you make an appointment with your clinic and go to the appointment.  If you do not follow-up with your primary provider, it may result in missing an important development which could result in permanent injury or disability and/or lasting pain.  If there is any problem keeping your appointment, call your provider or return to the Emergency  "Department.    Medications:  Take your medications as directed by your provider today.  Before using over-the-counter medications, ask your provider and make sure to take the medications as directed.  If you have any questions about medications, ask your provider.    Diet:  Resume your normal diet as much as possible, but do not eat fried, fatty or spicy foods while you have pain.  Do not drink alcohol or have caffeine.  Do not smoke tobacco.    Probiotics: If you have been given an antibiotic, you may want to also take a probiotic pill or eat yogurt with live cultures. Probiotics have \"good bacteria\" to help your intestines stay healthy. Studies have shown that probiotics help prevent diarrhea (loose stools) and other intestine problems (including C. diff infection) when you take antibiotics. You can buy these without a prescription in the pharmacy section of the store.     If you were given a prescription for medicine here today, be sure to read all of the information (including the package insert) that comes with your prescription.  This will include important information about the medicine, its side effects, and any warnings that you need to know about.  The pharmacist who fills the prescription can provide more information and answer questions you may have about the medicine.  If you have questions or concerns that the pharmacist cannot address, please call or return to the Emergency Department.       Remember that you can always come back to the Emergency Department if you are not able to see your regular provider in the amount of time listed above, if you get any new symptoms, or if there is anything that worries you.    "

## 2020-08-24 NOTE — ED PROVIDER NOTES
"History     Chief Complaint:  Abdominal Pain     The history is provided by the patient.     Alex Rivas is a 25 year old year old female with a history of irritated stomach lining, scoliosis and palpatoins who presents for evaluation of abdominal pain. The patient states that this has been a \"9 year journey\" of abdominal/ stomach issues, when the pain first started it was central but now, over the past few weeks has been more on the right side accompanied by flank pain. The patient states over the past 3 weeks every time she eats solid food she has excruciating stomach pain. The patient also says that she has not had a solid bowel movement since the new pain onset, she says she has around 8 loose stool movements per day that are not black, but dark brown. The patients says that she has been taking one Prilosec per day for the past few weeks which helped at first but is now seeing no relief. Today, while in the ED, the patients is experiencing some nausea and that abdominal pain is an 8 while her back pain is a 10.  Patient denies change in urination, abnormal vaginal discharge, fever or abdominal surgery.     US Axillary left 02/13/20:  Impression:    BI-RADS CATEGORY: 1 - NEGATIVE    Allergies:  Gluten Meal  Milk Protein Extract  Amoxicillin    Medications:   Prilosec  Antabuse  Prozac    Medical History:   Gluten intolerance  Lyme disease  Migraine   Scoliosis  Spondylolisthesis  Wenckebach second degree AV block   Palpitations  Idiopathic mast cell activation syndrome    Surgical History:  Colonoscopy and EDG  [place] teeth removal     Family History:   Hypertension      Social History:  Smoke: No  Alcohol: No  Drug: No      Review of Systems   Constitutional: Negative for fever.   Gastrointestinal: Positive for abdominal pain and nausea.   Genitourinary: Positive for flank pain and pelvic pain. Negative for decreased urine volume and vaginal discharge.   Neurological: Positive for light-headedness.   All " other systems reviewed and are negative.        Physical Exam     Patient Vitals for the past 24 hrs:   BP Temp Pulse Resp SpO2   08/23/20 2245 -- -- -- -- 99 %   08/23/20 2230 -- -- 82 -- 98 %   08/23/20 2130 (!) 121/90 -- 82 -- 99 %   08/23/20 2115 -- -- -- -- 99 %   08/23/20 2100 (!) 132/92 -- -- -- --   08/23/20 2045 (!) 126/92 -- -- -- 98 %   08/23/20 2041 -- 99.2  F (37.3  C) -- 18 --   08/23/20 2040 (!) 126/92 -- 87 -- 98 %   08/23/20 2034 -- -- -- -- 99 %       Physical Exam   Constitutional: Well developed, nontox appearance  Head: Atraumatic.   Neck:  no stridor  Eyes: no scleral icterus  Cardiovascular: RRR, 2+ bilat radial pulses  Pulmonary/Chest: nml resp effort, Clear BS bilat  Abdominal: ND, +BS, soft, RUQ tenderness, remaining abd NT, equivocal Robles's sign, no rebound or guarding   : bilat CVA tenderness bilat  Ext: Warm, well perfused, no edema  Neurological: A&O, symmetric facies, moves ext x4  Skin: Skin is warm and dry.   Psychiatric: Behavior is normal. Thought content normal.   Nursing note and vitals reviewed.        Emergency Department Course     PROCEDURES  [facility] Procedure Note      Limited Bedside ED Gallbladder  Ultrasound:     PROCEDURE: PERFORMED BY: Dr. Filemon Hartman MD  INDICATIONS:  RUQ/Epigastric Pain  PROBE:  Low frequency convex probe  BODY LOCATION: Abdomen  FINDINGS:   An ultrasound of the gallbladder was performed using longitudinal and transverse views.  Gallstone(s):  Absent  Gallbladder sludge:  Absent  Sonographic Robles's sign:  Absent  Gallbladder wall thickening (greater than 4 mm):  Absent  Pericholecystic fluid: Absent  Common bile duct (dilated if internal diameter greater than 6 mm): Unable to visualize  INTERPRETATION:  The gallbladder evaluation is normal with no gallstones/sludge, no sonographic Robles's sign, no GB wall thickening, no pericholecystic fluid, and without evidence of cholelithiasis or cholecystitis.  IMAGE DOCUMENTATION:  Images were archived to PACs system.        Laboratory:  Laboratory findings were communicated with the patient who voiced understanding of the findings.    CBC: WBC: 7.5, HGB: 12.4, PLT: 305    CMP: Glucose 88, o/w WNL (Creatinine: 0.93)    Lipase - 172     HCG qualitative pregnancy - Negative     UA with Microscopic: pH 7.5(H) Bacteria few; Mucous Present; o/w Negative    Imaging:  Radiology findings were communicated with the patient who voiced understanding of the findings.     XR Abdomen 2 Views   Final Result   IMPRESSION: Negative abdomen. Bowel gas pattern is normal. Nothing for obstruction or free air. Modest amount stool throughout colon. No evidence for renal stones.       POC US ABDOMEN LIMITED    (Results Pending)     Interventions:  2137 GI Cocktail 30 mL suspension, PO   2138 Carafate 1 g PO   2235 Tramadol 100mg PO    Medications   ondansetron (ZOFRAN) injection 4 mg (has no administration in time range)   lidocaine (XYLOCAINE) 2 % 15 mL, alum & mag hydroxide-simethicone (MAALOX  ES) 15 mL GI Cocktail (30 mLs Oral Given 8/23/20 2137)   sucralfate (CARAFATE) suspension 1 g (1 g Oral Given 8/23/20 2138)   traMADol (ULTRAM) tablet 100 mg (100 mg Oral Given 8/23/20 2235)   HYDROcodone-acetaminophen (NORCO) 5-325 MG per tablet 1 tablet (1 tablet Oral Given 8/24/20 0043)   ondansetron (ZOFRAN-ODT) ODT tab 4 mg (4 mg Oral Given 8/24/20 0043)       Emergency Department Course:  Past medical records, nursing notes, and vitals reviewed.    8:54 PM I performed an exam of the patient as documented above.     The patient provided a urine sample here in the emergency department. This was sent for laboratory testing, findings above.     2224 I rechecked the patient and discussed the results of her workup thus far.     Findings and plan explained to the Patient. Patient discharged home with instructions regarding supportive care, medications, and reasons to return. The importance of close follow-up was  reviewed.    I personally reviewed the laboratory and imaging results with the Patient and answered all related questions prior to discharge.     Impression & Plan     Medical Decision Making:  Alex Rivas is a 25 year old female who presents today for RUQ abd pain     Differential diagnosis includes right upper quadrant pain NOS, duodenal ulcer, gastric ulcer, duodenitis, gastritis, hepatitis, pancreatitis, biliary dysfunction.  Labs ordered as noted above and largely unremarkable.  Given the patient's previous ultrasound, I think acute acalculous cholecystitis is unlikely.  GI cocktail/Carafate without improvement.  Screening x-ray unremarkable.  There is no evidence on exam, and vital signs that would indicate hollow viscus perforation.  Given the radiation risk in context of the patient's symptoms, I do not think CT scanning is indicated at this time and this was discussed with the patient and her mother who are in agreement.  Bedside ultrasound for repeat evaluation of acute biliary pathology was unremarkable.  I think the patient most likely needs to follow-up with gastroenterology for an EGD.  She has an appointment in September but she would likely benefit from earlier follow-up.  She was given the information for [facility] consultants.  At this time I feel the pt is safe for discharge.  Recommendations given regarding follow up with GI and return to the emergency department as needed for new or worsening symptoms.  Pt and mother counseled on all results, disposition and diagnosis.  They are understanding and agreeable to plan. Patient discharged in stable condition.        Diagnosis:    ICD-10-CM    1. RUQ abdominal pain  R10.11        Disposition:  Discharged to home.    Discharge Medications:  New Prescriptions    HYDROCODONE-ACETAMINOPHEN (NORCO) 5-325 MG TABLET    Take 1 tablet by mouth every 6 hours as needed for severe pain    ONDANSETRON (ZOFRAN ODT) 4 MG ODT TAB    Take 1 tablet (4 mg) by mouth  every 6 hours as needed    PANTOPRAZOLE (PROTONIX) 40 MG EC TABLET    Take 1 tablet (40 mg) by mouth 2 times daily for 14 days    POLYETHYLENE GLYCOL (MIRALAX) 17 GM/DOSE POWDER    Take 17 g (1 capful) by mouth daily       Scribe Disclosure:  I, Darshan Pichardo, am serving as a scribe at 8:54 PM on 8/23/2020 to document services personally performed by Filemon Hartman MD, based on my observations and the provider's statements to me.      Filemon Hartman MD  08/24/20 [id number]

## 2020-08-27 ENCOUNTER — TELEPHONE (OUTPATIENT)
Dept: GASTROENTEROLOGY | Facility: CLINIC | Age: 25
End: 2020-08-27

## 2020-08-27 NOTE — TELEPHONE ENCOUNTER
Callrey Marrero- Parent- has consent to assist    Reason for cancel? No longer needed    Rescheduled? no    Will patient call back to reschedule?

## 2020-12-20 ENCOUNTER — HEALTH MAINTENANCE LETTER (OUTPATIENT)
Age: 25
End: 2020-12-20

## 2021-04-18 ENCOUNTER — HEALTH MAINTENANCE LETTER (OUTPATIENT)
Age: 26
End: 2021-04-18

## 2021-10-03 ENCOUNTER — HEALTH MAINTENANCE LETTER (OUTPATIENT)
Age: 26
End: 2021-10-03

## 2021-11-15 RX ORDER — GABAPENTIN 100 MG/1
CAPSULE ORAL
Qty: 120 CAPSULE | Refills: 1 | OUTPATIENT
Start: 2021-11-15

## 2022-03-28 ENCOUNTER — APPOINTMENT (OUTPATIENT)
Dept: RADIOLOGY | Facility: CLINIC | Age: 27
End: 2022-03-28
Attending: EMERGENCY MEDICINE
Payer: COMMERCIAL

## 2022-03-28 ENCOUNTER — HOSPITAL ENCOUNTER (EMERGENCY)
Facility: CLINIC | Age: 27
Discharge: HOME | End: 2022-03-29
Attending: EMERGENCY MEDICINE | Admitting: EMERGENCY MEDICINE
Payer: COMMERCIAL

## 2022-03-28 DIAGNOSIS — J21.9 BRONCHIOLITIS: ICD-10-CM

## 2022-03-28 LAB
ALBUMIN SERPL-MCNC: 3.5 G/DL (ref 3.5–5)
ALBUMIN SERPL-MCNC: 4.3 G/DL (ref 3.5–5)
ALP SERPL-CCNC: 80 U/L (ref 45–120)
ALP SERPL-CCNC: 95 U/L (ref 45–120)
ALT SERPL W P-5'-P-CCNC: 26 U/L (ref 0–45)
ALT SERPL W P-5'-P-CCNC: 30 U/L (ref 0–45)
ANION GAP SERPL CALCULATED.3IONS-SCNC: 10 MMOL/L (ref 5–18)
ANION GAP SERPL CALCULATED.3IONS-SCNC: 8 MMOL/L (ref 5–18)
AST SERPL W P-5'-P-CCNC: 23 U/L (ref 0–40)
AST SERPL W P-5'-P-CCNC: 24 U/L (ref 0–40)
BASE EXCESS BLDV CALC-SCNC: 3 MMOL/L
BASOPHILS # BLD AUTO: 0 10E3/UL (ref 0–0.2)
BASOPHILS NFR BLD AUTO: 1 %
BILIRUB DIRECT SERPL-MCNC: 0.1 MG/DL
BILIRUB SERPL-MCNC: 0.3 MG/DL (ref 0–1)
BILIRUB SERPL-MCNC: 0.4 MG/DL (ref 0–1)
BUN SERPL-MCNC: 10 MG/DL (ref 8–22)
BUN SERPL-MCNC: 9 MG/DL (ref 8–22)
CALCIUM SERPL-MCNC: 8.8 MG/DL (ref 8.5–10.5)
CALCIUM SERPL-MCNC: 9.6 MG/DL (ref 8.5–10.5)
CHLORIDE BLD-SCNC: 106 MMOL/L (ref 98–107)
CHLORIDE BLD-SCNC: 108 MMOL/L (ref 98–107)
CO2 SERPL-SCNC: 23 MMOL/L (ref 22–31)
CO2 SERPL-SCNC: 24 MMOL/L (ref 22–31)
CREAT SERPL-MCNC: 0.78 MG/DL (ref 0.6–1.1)
CREAT SERPL-MCNC: 0.83 MG/DL (ref 0.6–1.1)
D DIMER PPP FEU-MCNC: 0.42 UG/ML FEU (ref 0–0.5)
EOSINOPHIL # BLD AUTO: 0.1 10E3/UL (ref 0–0.7)
EOSINOPHIL NFR BLD AUTO: 1 %
ERYTHROCYTE [DISTWIDTH] IN BLOOD BY AUTOMATED COUNT: 12.6 % (ref 10–15)
ERYTHROCYTE [DISTWIDTH] IN BLOOD BY AUTOMATED COUNT: 12.7 % (ref 10–15)
FLUAV RNA SPEC QL NAA+PROBE: NEGATIVE
FLUBV RNA RESP QL NAA+PROBE: NEGATIVE
GFR SERPL CREATININE-BSD FRML MDRD: >90 ML/MIN/1.73M2
GFR SERPL CREATININE-BSD FRML MDRD: >90 ML/MIN/1.73M2
GLUCOSE BLD-MCNC: 109 MG/DL (ref 70–125)
GLUCOSE BLD-MCNC: 97 MG/DL (ref 70–125)
HCG SERPL-ACNC: <2 MLU/ML (ref 0–4)
HCO3 BLDV-SCNC: 24 MMOL/L (ref 24–30)
HCT VFR BLD AUTO: 34.4 % (ref 35–47)
HCT VFR BLD AUTO: 39.3 % (ref 35–47)
HGB BLD-MCNC: 11.5 G/DL (ref 11.7–15.7)
HGB BLD-MCNC: 12.9 G/DL (ref 11.7–15.7)
IMM GRANULOCYTES # BLD: 0 10E3/UL
IMM GRANULOCYTES NFR BLD: 1 %
LYMPHOCYTES # BLD AUTO: 2.3 10E3/UL (ref 0.8–5.3)
LYMPHOCYTES NFR BLD AUTO: 32 %
MCH RBC QN AUTO: 31.7 PG (ref 26.5–33)
MCH RBC QN AUTO: 32 PG (ref 26.5–33)
MCHC RBC AUTO-ENTMCNC: 32.8 G/DL (ref 31.5–36.5)
MCHC RBC AUTO-ENTMCNC: 33.4 G/DL (ref 31.5–36.5)
MCV RBC AUTO: 95 FL (ref 78–100)
MCV RBC AUTO: 98 FL (ref 78–100)
MONOCYTES # BLD AUTO: 0.4 10E3/UL (ref 0–1.3)
MONOCYTES NFR BLD AUTO: 6 %
NEUTROPHILS # BLD AUTO: 4.3 10E3/UL (ref 1.6–8.3)
NEUTROPHILS NFR BLD AUTO: 59 %
NRBC # BLD AUTO: 0 10E3/UL
NRBC BLD AUTO-RTO: 0 /100
OXYHGB MFR BLDV: 12.7 % (ref 70–75)
PCO2 BLDV: 50 MM HG (ref 35–50)
PH BLDV: 7.36 [PH] (ref 7.35–7.45)
PLATELET # BLD AUTO: 329 10E3/UL (ref 150–450)
PLATELET # BLD AUTO: 368 10E3/UL (ref 150–450)
PO2 BLDV: <15 MM HG (ref 25–47)
POTASSIUM BLD-SCNC: 4.1 MMOL/L (ref 3.5–5)
POTASSIUM BLD-SCNC: 4.2 MMOL/L (ref 3.5–5)
PROT SERPL-MCNC: 6.7 G/DL (ref 6–8)
PROT SERPL-MCNC: 7.9 G/DL (ref 6–8)
RBC # BLD AUTO: 3.63 10E6/UL (ref 3.8–5.2)
RBC # BLD AUTO: 4.03 10E6/UL (ref 3.8–5.2)
SAO2 % BLDV: 13.5 % (ref 70–75)
SARS-COV-2 RNA RESP QL NAA+PROBE: NEGATIVE
SODIUM SERPL-SCNC: 139 MMOL/L (ref 136–145)
SODIUM SERPL-SCNC: 140 MMOL/L (ref 136–145)
WBC # BLD AUTO: 7.2 10E3/UL (ref 4–11)
WBC # BLD AUTO: 7.8 10E3/UL (ref 4–11)

## 2022-03-28 PROCEDURE — 82248 BILIRUBIN DIRECT: CPT | Performed by: PHYSICIAN ASSISTANT

## 2022-03-28 PROCEDURE — 87636 SARSCOV2 & INF A&B AMP PRB: CPT | Performed by: PHYSICIAN ASSISTANT

## 2022-03-28 PROCEDURE — 85027 COMPLETE CBC AUTOMATED: CPT | Mod: XU | Performed by: EMERGENCY MEDICINE

## 2022-03-28 PROCEDURE — 99284 EMERGENCY DEPT VISIT MOD MDM: CPT | Mod: 25

## 2022-03-28 PROCEDURE — 82805 BLOOD GASES W/O2 SATURATION: CPT | Performed by: PHYSICIAN ASSISTANT

## 2022-03-28 PROCEDURE — 36415 COLL VENOUS BLD VENIPUNCTURE: CPT | Performed by: EMERGENCY MEDICINE

## 2022-03-28 PROCEDURE — 80053 COMPREHEN METABOLIC PANEL: CPT | Performed by: PHYSICIAN ASSISTANT

## 2022-03-28 PROCEDURE — 84450 TRANSFERASE (AST) (SGOT): CPT | Performed by: EMERGENCY MEDICINE

## 2022-03-28 PROCEDURE — 84702 CHORIONIC GONADOTROPIN TEST: CPT | Performed by: EMERGENCY MEDICINE

## 2022-03-28 PROCEDURE — 85027 COMPLETE CBC AUTOMATED: CPT | Performed by: PHYSICIAN ASSISTANT

## 2022-03-28 PROCEDURE — C9803 HOPD COVID-19 SPEC COLLECT: HCPCS

## 2022-03-28 PROCEDURE — 85379 FIBRIN DEGRADATION QUANT: CPT | Performed by: EMERGENCY MEDICINE

## 2022-03-28 PROCEDURE — 71045 X-RAY EXAM CHEST 1 VIEW: CPT

## 2022-03-28 PROCEDURE — 36415 COLL VENOUS BLD VENIPUNCTURE: CPT | Performed by: PHYSICIAN ASSISTANT

## 2022-03-28 RX ORDER — GABAPENTIN 100 MG/1
200 CAPSULE ORAL AT BEDTIME
COMMUNITY
End: 2024-07-29

## 2022-03-28 RX ORDER — DAPSONE 25 MG/1
75 TABLET ORAL 2 TIMES DAILY
COMMUNITY
End: 2022-07-20

## 2022-03-28 RX ORDER — DOXYCYCLINE 100 MG/1
100 CAPSULE ORAL 2 TIMES DAILY
COMMUNITY
End: 2022-07-20

## 2022-03-28 RX ORDER — ACETAMINOPHEN 325 MG/1
325-650 TABLET ORAL EVERY 6 HOURS PRN
COMMUNITY

## 2022-03-28 RX ORDER — LANOLIN ALCOHOL/MO/W.PET/CERES
3 CREAM (GRAM) TOPICAL AT BEDTIME
COMMUNITY

## 2022-03-28 RX ORDER — FLUOXETINE 10 MG/1
30 CAPSULE ORAL DAILY
COMMUNITY

## 2022-03-28 RX ORDER — IBUPROFEN 200 MG
400 TABLET ORAL EVERY 4 HOURS PRN
COMMUNITY
End: 2022-05-24

## 2022-03-28 RX ORDER — RIFAMPIN 300 MG/1
300 CAPSULE ORAL 2 TIMES DAILY
COMMUNITY
End: 2022-07-20

## 2022-03-28 RX ORDER — ESZOPICLONE 2 MG/1
2 TABLET, FILM COATED ORAL AT BEDTIME
COMMUNITY

## 2022-03-28 RX ORDER — ONDANSETRON 8 MG/1
8 TABLET, ORALLY DISINTEGRATING ORAL EVERY 8 HOURS PRN
COMMUNITY
End: 2024-05-01

## 2022-03-28 ASSESSMENT — ENCOUNTER SYMPTOMS
FATIGUE: 1
MYALGIAS: 1
SHORTNESS OF BREATH: 1

## 2022-03-28 NOTE — ED PROVIDER NOTES
EMERGENCY DEPARTMENT ENCOUNTER       ED Course & Medical Decision Making     7:02 PM I met with the patient to gather history and to perform my initial exam. I discussed the plan for care while in the Emergency Department. PPE (gloves, protective eyewear, surgical cap, and N95 mask) was worn by me during patient encounters while patient wore mask.   10:20 PM I re-evaluated and updated patient.   11:54 PM I re-evaluated and updated patient. We discussed plans for discharge and patient is agreeable.      I saw and examined the patient.  IV was established and she was placed on the monitor.  Diagnostics ordered.  Chest x-ray returns okay.  Laboratory studies normal.  She was monitored and she had no significant hypoxia here.  Questionable if she had a factitious low reading at home.  I do feel that sending her home with a new pulse oximeter that she can compare with would be indicated.  She does have some faint wheezing and lungs sound somewhat tight.  This is likely some viral initiated bronchiolitis and she has responded to steroids and albuterol in the past.  I will prescribe some prednisone and albuterol.  She will need to follow-up with her regular doctor        Prior to making a final disposition on this patient the results of patient's tests and other diagnostic studies were discussed with the patient. All questions were answered. Patient expressed understanding of the plan and was amenable to it.    Medications - No data to display    Final Impression     1. Bronchiolitis            Chief Complaint     No chief complaint on file.      Patient reports that she started a treatment 2 weeks ago for her chronic Lyme disease, checks oxygen saturation as part of treatment and meter was reading at 87% on room air. 93% in triage.         HPI       Alex Rivas is a 26 year old female with a history of lyme disease, Wenckebach second degree AV block, migraine, who presents for evaluation of low O2 saturation.      Patient reports a ~10 year history of POTS and chronic lyme's disease and has been undergoing treatment for this. She has a pulse oximeter at home and states today she had an oxygen saturation reading as low as 87 % on RA. Patient states she has experienced shortness of breath, fatigue, and generalized myalgias, for the past 2 days with associated chest discomfort. Patient states theses symptoms are similar to what she has typically experienced chronically but are worse today. Patient denies additional medical concerns or complaints at this time.      I, Henny Virkedorn am serving as a scribe to document services personally performed by Alexandru Disla M.D. based on my observation and the provider's statements to me. I, Alexandru Disla M.D attest that Henny Christianson is acting in a scribe capacity, has observed my performance of the services and has documented them in accordance with my direction.    Past Medical History     Past Medical History:   Diagnosis Date     Gluten intolerance      Lyme disease      Migraine      Scoliosis      Spondylolisthesis      Wenckebach second degree AV block      Past Surgical History:   Procedure Laterality Date     Colonoscopy and EGD  2011    gastris, otherwise normal     wisdom teeth       Family History   Problem Relation Age of Onset     Hypertension Mother      Breast Cancer Paternal Aunt      Cancer Paternal Grandmother      Heart Disease Paternal Grandmother      Hypertension Father       Social History     Tobacco Use     Smoking status: Never Smoker     Smokeless tobacco: Never Used   Substance Use Topics     Alcohol use: No     Alcohol/week: 0.0 standard drinks     Drug use: No       Relevant past medical, surgical, family and social history as documented above, has been reviewed and discussed with patient. No changes or additions, unless otherwise noted in the HPI.    Current Medications     acetaminophen (TYLENOL) 325 MG tablet  albuterol (PROAIR HFA/PROVENTIL  "HFA/VENTOLIN HFA) 108 (90 Base) MCG/ACT inhaler  dapsone (ACZONE) 25 MG tablet  diphenhydrAMINE (BENADRYL) 25 MG capsule  doxycycline hyclate (VIBRAMYCIN) 100 MG capsule  eszopiclone (LUNESTA) 2 MG tablet  FLUoxetine (PROZAC) 10 MG capsule  gabapentin (NEURONTIN) 100 MG capsule  ibuprofen (ADVIL/MOTRIN) 200 MG tablet  melatonin 3 MG tablet  ondansetron (ZOFRAN ODT) 4 MG ODT tab  ondansetron (ZOFRAN-ODT) 8 MG ODT tab  predniSONE (DELTASONE) 10 MG tablet  rifampin (RIFADIN) 300 MG capsule        Allergies     Allergies   Allergen Reactions     Dairy Products [Milk Protein Extract] GI Disturbance     Gluten Meal Anaphylaxis     Other reaction(s): GI Upset     Amoxicillin Diarrhea and Nausea and Vomiting       Review of Systems     Review of Systems   Constitutional: Positive for fatigue.   Respiratory: Positive for shortness of breath.    Cardiovascular: Positive for chest pain (discomort).   Musculoskeletal: Positive for myalgias.      Remainder of systems reviewed, unless noted in HPI all others negative.    Physical Exam     /83   Pulse 82   Temp 99.1  F (37.3  C) (Oral)   Resp 23   Ht 1.575 m (5' 2\")   Wt 86.2 kg (190 lb)   LMP 03/22/2022   SpO2 94%   Breastfeeding No   BMI 34.75 kg/m      Physical Exam  Vitals and nursing note reviewed.   HENT:      Head: Normocephalic.      Nose: Nose normal.   Cardiovascular:      Rate and Rhythm: Normal rate.   Pulmonary:      Effort: Pulmonary effort is normal.      Comments: Faint late expiratory wheezing throughout lung sounds are slightly diminished  Neurological:      Mental Status: She is alert. Mental status is at baseline.   Psychiatric:         Mood and Affect: Mood normal.             Labs & Imaging         Labs Ordered and Resulted from Time of ED Arrival to Time of ED Departure   BLOOD GAS VENOUS - Abnormal       Result Value    pH Venous 7.36      pCO2 Venous 50      pO2 Venous <15 (*)     Bicarbonate Venous 24      Base Excess/Deficit (+/-) 3.0      " Oxyhemoglobin Venous 12.7 (*)     O2 Sat, Venous 13.5 (*)    COMPREHENSIVE METABOLIC PANEL - Abnormal    Sodium 139      Potassium 4.2      Chloride 108 (*)     Carbon Dioxide (CO2) 23      Anion Gap 8      Urea Nitrogen 9      Creatinine 0.78      Calcium 8.8      Glucose 97      Alkaline Phosphatase 80      AST 24      ALT 26      Protein Total 6.7      Albumin 3.5      Bilirubin Total 0.3      GFR Estimate >90     CBC WITH PLATELETS AND DIFFERENTIAL - Abnormal    WBC Count 7.2      RBC Count 3.63 (*)     Hemoglobin 11.5 (*)     Hematocrit 34.4 (*)     MCV 95      MCH 31.7      MCHC 33.4      RDW 12.7      Platelet Count 329      % Neutrophils 59      % Lymphocytes 32      % Monocytes 6      % Eosinophils 1      % Basophils 1      % Immature Granulocytes 1      NRBCs per 100 WBC 0      Absolute Neutrophils 4.3      Absolute Lymphocytes 2.3      Absolute Monocytes 0.4      Absolute Eosinophils 0.1      Absolute Basophils 0.0      Absolute Immature Granulocytes 0.0      Absolute NRBCs 0.0     CBC WITH PLATELETS - Normal    WBC Count 7.8      RBC Count 4.03      Hemoglobin 12.9      Hematocrit 39.3      MCV 98      MCH 32.0      MCHC 32.8      RDW 12.6      Platelet Count 368     INFLUENZA A/B & SARS-COV2 PCR MULTIPLEX - Normal    Influenza A PCR Negative      Influenza B PCR Negative      SARS CoV2 PCR Negative     BASIC METABOLIC PANEL - Normal    Sodium 140      Potassium 4.1      Chloride 106      Carbon Dioxide (CO2) 24      Anion Gap 10      Urea Nitrogen 10      Creatinine 0.83      Calcium 9.6      Glucose 109      GFR Estimate >90     HEPATIC FUNCTION PANEL - Normal    Bilirubin Total 0.4      Bilirubin Direct 0.1      Protein Total 7.9      Albumin 4.3      Alkaline Phosphatase 95      AST 23      ALT 30     D DIMER QUANTITATIVE - Normal    D-Dimer Quantitative 0.42     HCG QUANTITATIVE PREGNANCY - Normal    hCG Quantitative <2           Results for orders placed or performed during the hospital encounter  of 03/28/22   XR Chest 1 View    Impression    IMPRESSION: Negative chest.   CBC (+ platelets, no diff)   Result Value Ref Range    WBC Count 7.8 4.0 - 11.0 10e3/uL    RBC Count 4.03 3.80 - 5.20 10e6/uL    Hemoglobin 12.9 11.7 - 15.7 g/dL    Hematocrit 39.3 35.0 - 47.0 %    MCV 98 78 - 100 fL    MCH 32.0 26.5 - 33.0 pg    MCHC 32.8 31.5 - 36.5 g/dL    RDW 12.6 10.0 - 15.0 %    Platelet Count 368 150 - 450 10e3/uL   Blood gas venous   Result Value Ref Range    pH Venous 7.36 7.35 - 7.45    pCO2 Venous 50 35 - 50 mm Hg    pO2 Venous <15 (L) 25 - 47 mm Hg    Bicarbonate Venous 24 24 - 30 mmol/L    Base Excess/Deficit (+/-) 3.0   mmol/L    Oxyhemoglobin Venous 12.7 (L) 70.0 - 75.0 %    O2 Sat, Venous 13.5 (L) 70.0 - 75.0 %   Symptomatic; Unknown Influenza A/B & SARS-CoV2 (COVID-19) Virus PCR Multiplex Nasopharyngeal    Specimen: Nasopharyngeal; Swab   Result Value Ref Range    Influenza A PCR Negative Negative    Influenza B PCR Negative Negative    SARS CoV2 PCR Negative Negative   Basic metabolic panel   Result Value Ref Range    Sodium 140 136 - 145 mmol/L    Potassium 4.1 3.5 - 5.0 mmol/L    Chloride 106 98 - 107 mmol/L    Carbon Dioxide (CO2) 24 22 - 31 mmol/L    Anion Gap 10 5 - 18 mmol/L    Urea Nitrogen 10 8 - 22 mg/dL    Creatinine 0.83 0.60 - 1.10 mg/dL    Calcium 9.6 8.5 - 10.5 mg/dL    Glucose 109 70 - 125 mg/dL    GFR Estimate >90 >60 mL/min/1.73m2   Hepatic function panel   Result Value Ref Range    Bilirubin Total 0.4 0.0 - 1.0 mg/dL    Bilirubin Direct 0.1 <=0.5 mg/dL    Protein Total 7.9 6.0 - 8.0 g/dL    Albumin 4.3 3.5 - 5.0 g/dL    Alkaline Phosphatase 95 45 - 120 U/L    AST 23 0 - 40 U/L    ALT 30 0 - 45 U/L   Comprehensive metabolic panel   Result Value Ref Range    Sodium 139 136 - 145 mmol/L    Potassium 4.2 3.5 - 5.0 mmol/L    Chloride 108 (H) 98 - 107 mmol/L    Carbon Dioxide (CO2) 23 22 - 31 mmol/L    Anion Gap 8 5 - 18 mmol/L    Urea Nitrogen 9 8 - 22 mg/dL    Creatinine 0.78 0.60 - 1.10  mg/dL    Calcium 8.8 8.5 - 10.5 mg/dL    Glucose 97 70 - 125 mg/dL    Alkaline Phosphatase 80 45 - 120 U/L    AST 24 0 - 40 U/L    ALT 26 0 - 45 U/L    Protein Total 6.7 6.0 - 8.0 g/dL    Albumin 3.5 3.5 - 5.0 g/dL    Bilirubin Total 0.3 0.0 - 1.0 mg/dL    GFR Estimate >90 >60 mL/min/1.73m2   D dimer quantitative   Result Value Ref Range    D-Dimer Quantitative 0.42 0.00 - 0.50 ug/mL FEU   HCG quantitative pregnancy   Result Value Ref Range    hCG Quantitative <2 0 - 4 mlU/mL   CBC with platelets and differential   Result Value Ref Range    WBC Count 7.2 4.0 - 11.0 10e3/uL    RBC Count 3.63 (L) 3.80 - 5.20 10e6/uL    Hemoglobin 11.5 (L) 11.7 - 15.7 g/dL    Hematocrit 34.4 (L) 35.0 - 47.0 %    MCV 95 78 - 100 fL    MCH 31.7 26.5 - 33.0 pg    MCHC 33.4 31.5 - 36.5 g/dL    RDW 12.7 10.0 - 15.0 %    Platelet Count 329 150 - 450 10e3/uL    % Neutrophils 59 %    % Lymphocytes 32 %    % Monocytes 6 %    % Eosinophils 1 %    % Basophils 1 %    % Immature Granulocytes 1 %    NRBCs per 100 WBC 0 <1 /100    Absolute Neutrophils 4.3 1.6 - 8.3 10e3/uL    Absolute Lymphocytes 2.3 0.8 - 5.3 10e3/uL    Absolute Monocytes 0.4 0.0 - 1.3 10e3/uL    Absolute Eosinophils 0.1 0.0 - 0.7 10e3/uL    Absolute Basophils 0.0 0.0 - 0.2 10e3/uL    Absolute Immature Granulocytes 0.0 <=0.4 10e3/uL    Absolute NRBCs 0.0 10e3/uL       Alexandru Disla MD  Emergency Medicine  [facility] EMERGENCY ROOM  [address] [address] [phone]  3/28/2022       Alexandru Disla MD  03/29/22 0044

## 2022-03-28 NOTE — ED TRIAGE NOTES
Patient reports that she started a treatment 2 weeks ago for her chronic lymes disease, checks oxygen saturation as part of treatment and meter was reading at 87% on room air. 93% in triage.

## 2022-03-29 VITALS
HEIGHT: 62 IN | TEMPERATURE: 99.1 F | WEIGHT: 190 LBS | OXYGEN SATURATION: 94 % | HEART RATE: 82 BPM | DIASTOLIC BLOOD PRESSURE: 83 MMHG | RESPIRATION RATE: 23 BRPM | BODY MASS INDEX: 34.96 KG/M2 | SYSTOLIC BLOOD PRESSURE: 129 MMHG

## 2022-03-29 RX ORDER — PREDNISONE 10 MG/1
TABLET ORAL
Qty: 7 TABLET | Refills: 0 | Status: SHIPPED | OUTPATIENT
Start: 2022-03-29 | End: 2022-04-04

## 2022-03-29 RX ORDER — ALBUTEROL SULFATE 90 UG/1
2 AEROSOL, METERED RESPIRATORY (INHALATION) EVERY 6 HOURS PRN
Qty: 8 G | Refills: 0 | Status: SHIPPED | OUTPATIENT
Start: 2022-03-29 | End: 2022-05-24

## 2022-03-29 NOTE — PHARMACY-ADMISSION MEDICATION HISTORY
Pharmacy Note - Admission Medication History    Pertinent Provider Information: none     ______________________________________________________________________    Prior To Admission (PTA) med list completed and updated in EMR.       PTA Med List   Medication Sig Last Dose     acetaminophen (TYLENOL) 325 MG tablet Take 325-650 mg by mouth every 6 hours as needed for mild pain Past Week at Unknown time     dapsone (ACZONE) 25 MG tablet Take 75 mg by mouth 2 times daily 3/28/2022 at rifamp     diphenhydrAMINE (BENADRYL) 25 MG capsule Take 25-50 mg by mouth nightly as needed for itching or allergies  3/27/2022 at Unknown time     doxycycline hyclate (VIBRAMYCIN) 100 MG capsule Take 100 mg by mouth 2 times daily 3/28/2022 at Unknown time     eszopiclone (LUNESTA) 2 MG tablet Take 2 mg by mouth at bedtime as needed, may repeat once for sleep 3/27/2022 at Unknown time     FLUoxetine (PROZAC) 10 MG capsule Take 30 mg by mouth daily 3/28/2022 at Unknown time     gabapentin (NEURONTIN) 100 MG capsule Take 200 mg by mouth At Bedtime 3/27/2022 at Unknown time     ibuprofen (ADVIL/MOTRIN) 200 MG tablet Take 400 mg by mouth every 4 hours as needed for mild pain Past Week at Unknown time     melatonin 3 MG tablet Take 6 mg by mouth At Bedtime 3/27/2022 at Unknown time     ondansetron (ZOFRAN ODT) 4 MG ODT tab Take 1 tablet (4 mg) by mouth every 6 hours as needed Past Week at Unknown time     ondansetron (ZOFRAN-ODT) 8 MG ODT tab Take 8 mg by mouth every 8 hours as needed for nausea Past Week at Unknown time     rifampin (RIFADIN) 300 MG capsule Take 300 mg by mouth 2 times daily 3/28/2022 at Unknown time       Information source(s): Patient  Method of interview communication: in-person    Summary of Changes to PTA Med List  New: all      Patient was asked about OTC/herbal products specifically.  PTA med list reflects this.    I  The information provided in this note is only as accurate as the sources available at the time of the  update(s).    Thank you for the opportunity to participate in the care of this patient.    Emeka Garcia [facility]  3/28/2022 10:10 PM

## 2022-04-21 ENCOUNTER — TRANSCRIBE ORDERS (OUTPATIENT)
Dept: OTHER | Age: 27
End: 2022-04-21
Payer: COMMERCIAL

## 2022-04-21 DIAGNOSIS — R07.89 CHEST TIGHTNESS: ICD-10-CM

## 2022-04-21 DIAGNOSIS — R06.09 DYSPNEA ON EXERTION: Primary | ICD-10-CM

## 2022-04-21 DIAGNOSIS — R06.09 DOE (DYSPNEA ON EXERTION): Primary | ICD-10-CM

## 2022-04-21 DIAGNOSIS — R09.02 HYPOXEMIA: ICD-10-CM

## 2022-04-22 ENCOUNTER — TELEPHONE (OUTPATIENT)
Dept: PULMONOLOGY | Facility: CLINIC | Age: 27
End: 2022-04-22
Payer: COMMERCIAL

## 2022-04-22 NOTE — TELEPHONE ENCOUNTER
RECORDS RECEIVED FROM: internal /ce    DATE RECEIVED: 7.20.22    NOTES STATUS DETAILS   OFFICE NOTE from referring provider internal  Emeka Mtz MD   OFFICE NOTE from other specialist     DISCHARGE SUMMARY from hospital     DISCHARGE REPORT from the ER internal /ce Internal -  3.28.22 Nighat Duran-   10.28.21 [facility] [place]    MEDICATION LIST internal     IMAGING  (NEED IMAGES AND REPORTS)     CT SCAN     CHEST XRAY (CXR) internal  3.28.22    TESTS     PULMONARY FUNCTION TESTING (PFT) internal  Scheduled 7.20.22

## 2022-04-22 NOTE — TELEPHONE ENCOUNTER
LVM regarding upcoming appointment with Dr. Lyon. Patient needs to schedule CXR prior to appointment with Dr. Lyon. Left direct call back phone number.

## 2022-04-22 NOTE — TELEPHONE ENCOUNTER
Patient called me back and scheduled CXR prior to appointment on 7/20/22 with Dr. Lyon. Patient was agreeable to scheduling CXR on 7/18/22. Patient scheduled for CXR on 7/18/22. Details of appointments confirmed with patient. Patient requested mailed itinerary to be sent to her. Patient's address verified.

## 2022-05-15 ENCOUNTER — HEALTH MAINTENANCE LETTER (OUTPATIENT)
Age: 27
End: 2022-05-15

## 2022-05-24 ENCOUNTER — OFFICE VISIT (OUTPATIENT)
Dept: CARDIOLOGY | Facility: CLINIC | Age: 27
End: 2022-05-24
Payer: COMMERCIAL

## 2022-05-24 VITALS
DIASTOLIC BLOOD PRESSURE: 80 MMHG | SYSTOLIC BLOOD PRESSURE: 118 MMHG | BODY MASS INDEX: 34.75 KG/M2 | WEIGHT: 190 LBS | HEART RATE: 84 BPM | RESPIRATION RATE: 24 BRPM

## 2022-05-24 DIAGNOSIS — R00.2 PALPITATIONS: Primary | ICD-10-CM

## 2022-05-24 PROCEDURE — 99205 OFFICE O/P NEW HI 60 MIN: CPT | Performed by: INTERNAL MEDICINE

## 2022-05-24 NOTE — NURSING NOTE
Patient educated to the following during visit and educated to contact RN or MD for any questions.     Plan:    EKG, Echocardiogram, cardiopulmonary stress test. Follow-up with patient with the results    Patient verbalized understanding of the plan.      Chen Rocha RN on 5/24/2022 at 3:40 PM

## 2022-05-24 NOTE — PATIENT INSTRUCTIONS
You were seen today in the Advanced [facility] Clinic at [facility].       Cardiology Providers you saw during your visit: Dr. Strange         Medication Changes:   No changes         Follow up Appointment Information:  1. Schedule for an EKG and Echocardiogram- this can be completed at [facility] if you would like  [phone]  2.. Schedule for a cardiopulmonary stress test at the [place] [phone]  3. We will let you know the results of these      Check-In  Time Check-In Location Estimated Length Procedure   Name        GOLD  waiting room 60-90 minutes Cardio Pulmonary Stress Test**     Procedure Preparations & Instructions     This is a non-invasive procedure that DOES require preparation:    Nothing to eat for 3 hours prior to your test  You may have clear liquids up to the time of your test  DO NOT use alcohol, tobacco or food/beverages containing caffeine for at least 12 hours prior to your test (ie. Coffee, tea, soda, chocolate, de-caffeinated beverages, certain medications including Excedrin, Anacin, NoDoz)  Please wear loose, two-piece clothing and comfortable, rubber soled shoes for walking     *For Patients with Diabetes: Your RN Coordinator will give you instructions on adjusting your diabetic medications for the day of your test  If you have questions please contact your diabetic care team  Remember to  bring your glucometer and insulin with you to take after your test if needed             Thank you for allowing us to be a part of your care here at the [facility]      If you have questions or concerns please contact us at:      Chen Rocha RN BSN   Cardiology Care Coordinator  [facility]   Questions and [phone] Option 1 to schedule or reach your care team

## 2022-05-24 NOTE — LETTER
2022    Emeka Mtz MD  [facility] [address]  [facility] [address]    RE: Alex Rivas       Dear Colleague,     I had the pleasure of seeing Alex Rivas in the [facility].  Service Date: 2022    Emeka Mtz MD  [facility]  [address]  [place]  [address]    RE:  Alex Rivas  MRN:  [id number]  :  1995    Dear Dr. Mtz:    We had the pleasure of seeing Ms. Alex Rivas in our [facility] Heart Failure and Pulmonary Hypertension Clinic at [facility].  Although you are familiar with her history, please allow me to summarize it for the purpose of our records.  Ms. Rivas is a 26-year-old female who was referred to us for further evaluation of apparent hypoxemia by Home Oximetry. Her oxygen saturation was as low as 87% with warm hands, as per the patient, at home.  She was specifically referred to us to exclude right-to-left shunt or V/Q mismatch as a potential etiology for her apparent hypoxemia.    Ms. Rivas has a complex constellation of symptoms including fatigue, exertional dyspnea, chest tightness, flushing, nausea and bone pain for the last several years.  She was initially thought to have a mast cell activation syndrome.  On further evaluation subsequently, she was diagnosed to have multiple tick-borne diseases based on a positive interferon-gamma release assay.  She was treated by late internist, Dr. Geronimo Koenig, and now with Dr. Mtz.  She is currently on a combination therapy of doxycycline, rifampin and dapsone.  She has gone through multiple therapies in the past for these symptoms, and this is her most recent regimen.  There is a plan to try methylene blue in the future, but as per the patient, she states that she has not taken methylene blue.    Ms. Rivas noted low oxygen level at home in mid March.  She  states that her oxygen level was as low as 87%.  She went to the ER.  There, her saturation was 94% on room air.  She was noted to have faint delayed expiratory wheezing, otherwise unremarkable exam and evaluation.  She was presumed to have bronchitis and was treated for this.  Subsequently, a month later, she was diagnosed with COVID in end of April.  She was not hospitalized for her COVID.  She was treated symptomatically.  She has recovered from her COVID now.    She has not noticed any further episodes of low oxygen level, as far as I can tell.  She has chronic dyspnea, chest tightness, nausea and fatigue, and this has not worsened since this episode.  She denies having any exertional syncope or presyncope.  No exertional chest pain or chest pain.  She has not had any lower extremity swelling or abdominal distention.  No recent hospitalizations.    PAST MEDICAL HISTORY:    1.  Migraine.  2.  Wenckebach second-degree AV block.  3.  Lyme's disease.  4.  Multiple tick-borne diseases.  5.  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  6.  Migraine.  7.  Scoliosis.  8.  Spondylolisthesis.    PAST SURGICAL HISTORY:  1.  Colonoscopy and endoscopy.  2.  [place] teeth extraction.    MEDICATIONS:  As per Epic.    REVIEW OF SYSTEMS:  A detailed 10-point review of systems obtained as described in history of present illness.  All other systems reviewed and are negative.    PERSONAL HISTORY:  She is currently working part-time as well as attending college.  She does not smoke.  She drinks alcohol socially.  She denies using any recreational drugs.  She lives with her parents.    PHYSICAL EXAMINATION:  She was comfortable.  She was in no apparent distress.  She was awake, alert, oriented x3.  Her blood pressure was 118/80.  Her pulse rate was 84.  Her respiratory rate was 24.  She was saturating 95% on room air.  She weighed 190 pounds.  She had no pallor, cyanosis or jaundice.  Her neck exam revealed no jugular venous  distention.  Her carotids were 2+ bilaterally.  Her pulse was regular in rhythm.  Cardiac auscultation revealed a normal S1 and normal S2.  She had no murmur, rub or gallop.  She had no extra heart sounds.  Auscultation of her lungs revealed equal air entry on both sides.  She had no wheezing today.  She had no crepitation.  Air entry was equal on both sides.  Her abdomen was soft with normal bowel sounds, no tenderness, no rigidity, no guarding.  She had no focal neurological deficit.  Her extremities showed no edema.  She had no cyanosis specifically, both in upper as well as lower extremities.    She had a chest x-ray when in the emergency room.  This showed completely normal chest x-ray.  She had no cardiopulmonary process on the chest x-ray.    Her most recent renal function, liver function and CBC were unremarkable except for mild anemia with a hemoglobin of 11.5.    She did have a venous gas during her emergency room visit.  This showed a pH of 7.36.  Her pCO2 was 50.  Her pO2 was less than 15.  Her venous saturation was 12.7%.    ASSESSMENT:  In summary, Ms. Wesley Rivas is a 26-year-old female with a diagnosis of multiple tick-borne diseases/mast cell activation syndrome and Wenckebach second degree AV block who was referred to us for further evaluation of apparent and hypoxemia.  The patient noted an oxygen saturation of 87% on room air at home.    The etiology of her apparent hypoxemia is unclear to me.  Her oxygen saturation in the ER was normal during her visit.  Her oxygen saturation today in the clinic was normal.  It is unclear to me why her venous oxyhemoglobin was 12.7 with a systemic saturation of 94% during her ER visit.  Methylene blue can affect oxy venous concentration; however, she states that she is not on methemoglobin.  Her primary care physician's note states that her methemoglobin level is 1.1.  So it is unclear to me whether she is really taking methemoglobin treatment or not.  We  will confirm this.    She has no prior history of congenital heart disease.  She has no history of Raynaud's disease.  To be thorough, I have recommended her to have an EKG, echocardiogram with a bubble study, and noninvasive cardiopulmonary exercise testing.  During this cardiopulmonary exercise testing, we will be able to monitor her heart rate response, blood pressure response and oxygen saturation during rest as well as exercise.  We will decide on the next step based on this initial evaluation.  Clinically, she is stable.  I discussed the plan of care with the patient and her mother.  They are wondering whether we need to do a transesophageal echocardiography.  I discussed with them that it would be better to start first with a transthoracic echocardiogram with bubble study and then go to transesophageal echocardiogram or cardiac MRI if needed.    We will have these tests scheduled in the next couple of weeks.  We will review this test and follow up with her on the next steps.  It was a pleasure meeting Ms. Milton Rivas in our Heart Failure and Pulmonary Hypertension Clinic at the [facility].  We thank you for involving us in her care.  Please do not hesitate to call us in the interim if you have any further questions.    Sincerely,    Alie Strange MD      D: 2022   T: 2022   MT: nimesh    Name:     MILTON RIVAS  MRN:      [id number]        Account:      [id number]   :      1995           Service Date: 2022       Document: [id number]    Thank you for allowing me to participate in the care of your patient.    Sincerely,   Alie Strange MD   [facility]  cc: Referred Self,

## 2022-05-31 ENCOUNTER — TELEPHONE (OUTPATIENT)
Dept: CARDIOLOGY | Facility: CLINIC | Age: 27
End: 2022-05-31

## 2022-05-31 NOTE — TELEPHONE ENCOUNTER
----- Message from Chen Rocha RN sent at 5/27/2022  1:27 PM CDT -----  Regarding: Scheduling  When able is someone able to help this patient get scheduled for the following:     EKG, Echocardiogram, cardiopulmonary stress test    Thanks  Chen Rocha RN      
HOSPITALIST Progress Note    Patient: Rob Esquivel Date: 2018   : 1940    77 year old male Date of admit:   2018     Consultant(s):  Dr. Marychuy Rubalcava - ID    Chief Complaint: Sepsis due to pneumonia/UTI    Subjective: Feeling better, denies chest pain and shortness of breath, appetite improving    Current medications:   Scheduled:   • insulin lispro   Subcutaneous 4x Daily AC & HS   • sodium chloride (PF)  2 mL Injection 2 times per day   • aspirin  81 mg Oral Daily   • ferrous sulfate  325 mg Oral Daily with breakfast   • gabapentin  300 mg Oral TID   • insulin detemir  20 Units Subcutaneous 2 times per day   • levothyroxine  125 mcg Oral QAM AC   • pantoprazole  40 mg Oral 2 times per day   • sodium chloride  1,000 mL Intravenous Once    Followed by   • sodium chloride  1,000 mL Intravenous Once   • metoPROLOL succinate  100 mg Oral Daily   • furosemide  20 mg Oral BID   • tamsulosin  0.4 mg Oral Daily   • calcium carbonate-vitamin D  1 tablet Oral Daily with breakfast   • vitamin - therapeutic multivitamins w/minerals  1 tablet Oral Daily   • senna  2 tablet Oral Nightly   • ceFEPIme (MAXIPIME) extended interval IVPB  2,000 mg Intravenous Daily   • heparin (porcine)  5,000 Units Subcutaneous 3 times per day     PHYSICAL EXAM:      Vital signs Last recorded value   Temperature  95.9 °F (35.5 °C) (18 1113)      Pulse  70 (18 1113)   Respiratory  20 (18 1113)   Blood Pressure  144/65 (18 1200)   Pulse Oximetry  96 % (18 1113)       Weight  99 kg (18 0634)  (upon admit:  Weight: 60.8 kg (18 0002)  )     GENERAL APPEARANCE: No acute distress, pleasant and conversant.  LUNGS: No accessory muscle use. CTA bilaterally. No rhonchi or wheezing.  HEART: Normal rate and rhythm, no murmurs, rubs or gallops.  ABDOMEN: Soft, nontender, non-distended. Normoactive bowel sounds.  EXTREMITIES: No clubbing, no cyanosis, 2+ edema. Bilateral BKA.    DATA REVIEWED:       Recent 
lvm to madhuri EKG, Echocardiogram, cardiopulmonary stress test     
Labs  Lab 06/14/18  0511 06/13/18  1542  06/13/18  0525 06/13/18  0003   WBC 5.5  --   --  10.1 11.7*   HCT 24.8*  --   --  20.8* 26.2*   HGB 8.1* 7.9*  < > 6.4* 8.1*     --   --  196 301   INR  --   --   --   --  1.1   SODIUM 139  --   --  134* 133*   POTASSIUM 3.8  --   --  5.0 5.0   CHLORIDE 103  --   --  101 98   CO2 28  --   --  26 27   GLUCOSE 95  --   --  166* 149*   BUN 87*  --   --  88* 85*   CREATININE 2.93*  --   --  2.91* 2.81*   CALCIUM 8.3*  --   --  8.3* 8.7   ALBUMIN  --   --   --   --  2.9*   MG  --   --   --  1.7 1.2*   BILIRUBIN  --   --   --   --  0.9   ALKPT  --   --   --   --  200*   AST  --   --   --   --  26   GPT  --   --   --   --  34   < > = values in this interval not displayed.    ASSESSMENT AND PLAN:  1. Sepsis due to CAP/UTI - Currently on cefepime and vancomycin, urine culture growing MDRO E. Coli, change to Zosyn for now. Discontinue vancomycin with negative MRSA screen. Appreciate ID recommendations.    2. Chronic anemia - Some blood loss from left stump but not significant. Decent response to transfusion of two units of packed red blood cells. Monitor hemoglobin.    3. Cor pulmonale with passive congestion of the liver - Home dose of furosemide has been resumed, ascites fluid not consistent with infection.    4. Chronic kidney disease stage IV - Essentially at baseline, monitor.    5. Integumentary - Continue local wound care for abdominal ulcer and left stump ulcer.    6. Essential hypertension - Stable on home dose of metoprolol.    7. Diabetes mellitus type 2 - Good control. Continue long-acting insulin, short acting insulin on hold, continue sliding scale insulin as needed.    DISPOSITION: TBD     Kyler Gonzales MD  Board Certified, Internal Medicine    
Normal for race

## 2022-06-02 DIAGNOSIS — R00.2 PALPITATIONS: ICD-10-CM

## 2022-06-03 LAB
ATRIAL RATE - MUSE: 82 BPM
DIASTOLIC BLOOD PRESSURE - MUSE: NORMAL MMHG
INTERPRETATION ECG - MUSE: NORMAL
P AXIS - MUSE: 32 DEGREES
PR INTERVAL - MUSE: 178 MS
QRS DURATION - MUSE: 92 MS
QT - MUSE: 404 MS
QTC - MUSE: 472 MS
R AXIS - MUSE: -22 DEGREES
SYSTOLIC BLOOD PRESSURE - MUSE: NORMAL MMHG
T AXIS - MUSE: 17 DEGREES
VENTRICULAR RATE- MUSE: 82 BPM

## 2022-06-21 ENCOUNTER — ANCILLARY PROCEDURE (OUTPATIENT)
Dept: CARDIOLOGY | Facility: CLINIC | Age: 27
End: 2022-06-21
Attending: INTERNAL MEDICINE
Payer: COMMERCIAL

## 2022-06-21 ENCOUNTER — HOSPITAL ENCOUNTER (OUTPATIENT)
Dept: CARDIOLOGY | Facility: CLINIC | Age: 27
Discharge: HOME | End: 2022-06-21
Attending: INTERNAL MEDICINE | Admitting: INTERNAL MEDICINE
Payer: COMMERCIAL

## 2022-06-21 VITALS — WEIGHT: 198.41 LBS | BODY MASS INDEX: 33.87 KG/M2 | HEIGHT: 64 IN

## 2022-06-21 DIAGNOSIS — R00.2 PALPITATIONS: ICD-10-CM

## 2022-06-21 LAB — LVEF ECHO: NORMAL

## 2022-06-21 PROCEDURE — 93306 TTE W/DOPPLER COMPLETE: CPT | Performed by: INTERNAL MEDICINE

## 2022-06-21 PROCEDURE — 94621 CARDIOPULM EXERCISE TESTING: CPT

## 2022-06-21 PROCEDURE — 94621 CARDIOPULM EXERCISE TESTING: CPT | Mod: 26 | Performed by: INTERNAL MEDICINE

## 2022-06-24 LAB
CARDIOPULMONARY ANAEROBIC THRESHOLD PREDICTED PEAK: 54 %
CARDIOPULMONARY ANAEROBIC THRESHOLD VO2: 18.6 ML/KG/MIN
CARDIOPULMONARY BLOOD PRESSURE REST: NORMAL MMHG
CARDIOPULMONARY BREATHING RESERVE REST: 86.1
CARDIOPULMONARY BREATHING RESERVE V02MAX: 23
CARDIOPULMONARY CO2 OUTPUT REST: 299 ML/MIN
CARDIOPULMONARY CO2 OUTPUT VO2MAX: 1951 ML/MIN
CARDIOPULMONARY FEV 1.0 (L) ACTUAL: 2.62
CARDIOPULMONARY FEV 1.0 (L) PRECENT: 86 %
CARDIOPULMONARY FEV 1.0 (L) PREDICTED: 3.06
CARDIOPULMONARY FEV 1.0 FVC (%) ACTUAL: 88
CARDIOPULMONARY FEV 1.0 FVC (%) PERCENT: 104 %
CARDIOPULMONARY FEV 1.0 FVC (%) PREDICTED: 85
CARDIOPULMONARY FUNCTIONAL CAPACITY MAX ML/KG/MIN: 20.4 ML/KG/MIN
CARDIOPULMONARY FUNCTIONAL CAPACITY PERCENT: 59 %
CARDIOPULMONARY FUNCTIONAL CAPACITY PREDICTED: 34.5 ML/KG/MIN
CARDIOPULMONARY FVC (L) ACTUAL: 2.98
CARDIOPULMONARY FVC (L) PERCENT: 84 %
CARDIOPULMONARY FVC (L) PREDICTED: 3.55
CARDIOPULMONARY HEART RATE REST: 89 BPM
CARDIOPULMONARY MET'S REST: 4
CARDIOPULMONARY MINUTE VENTILATION REST: 12.7 L/MIN
CARDIOPULMONARY MINUTE VENTILATION VO2MAX: 70.6 L/MIN
CARDIOPULMONARY MYOCARDIAC O2 DEMAND MAX: NORMAL
CARDIOPULMONARY OXYGEN CONSUMPTION REST: 4.1 ML/KG/MIN
CARDIOPULMONARY OXYGEN CONSUMPTION VO2MAX: 20.4 ML/KG/MIN
CARDIOPULMONARY OXYGEN PULSE REST: 4 ML/BEAT
CARDIOPULMONARY OXYGEN PULSE VO2MAX: 9.13 ML/BEAT
CARDIOPULMONARY OXYGEN SATURATION- OXIMETRY REST: 100 %
CARDIOPULMONARY OXYGEN SATURATION- OXIMETRY VO2MAX: 99 %
CARDIOPULMONARY PET C02 REST: 31
CARDIOPULMONARY PET C02 VO2MAX: 31
CARDIOPULMONARY PET02 REST: 109
CARDIOPULMONARY PET02 V02 MAX: 115
CARDIOPULMONARY RER: 1.08
CARDIOPULMONARY RESPIRALORY EXCHANGE RATIO VO2MAX: 1.08
CARDIOPULMONARY RESPIRALORY EXCHANGE RATIO: 0.85
CARDIOPULMONARY RESPIRATORY RATE REST: 31 BR/MIN
CARDIOPULMONARY RESPIRATORY RATE VO2MAX: 56 BR/MIN
CARDIOPULMONARY STRESS BASE 1 BP MMHG: NORMAL MMHG
CARDIOPULMONARY STRESS BASE 1 BPA: 144 BPM
CARDIOPULMONARY STRESS BASE 1 SPO2: 100 % SPO2
CARDIOPULMONARY STRESS BASE 1 TIME SEC: 0 SEC
CARDIOPULMONARY STRESS BASE 1 TIME: 1 MINS
CARDIOPULMONARY STRESS BASE 2 BP MMHG: NORMAL MMHG
CARDIOPULMONARY STRESS BASE 2 BPA: 106 BPM
CARDIOPULMONARY STRESS BASE 2 SPO2: 100 % SPO2
CARDIOPULMONARY STRESS BASE 2 TIME SEC: 0 SEC
CARDIOPULMONARY STRESS BASE 2 TIME: 3 MINS
CARDIOPULMONARY STRESS BASE 3 BP MMHG: NORMAL MMHG
CARDIOPULMONARY STRESS BASE 3 BPA: 96 BPM
CARDIOPULMONARY STRESS BASE 3 SPO2: 100 % SPO2
CARDIOPULMONARY STRESS BASE 3 TIME SEC: 0 SEC
CARDIOPULMONARY STRESS BASE 3 TIME: 5 MINS
CARDIOPULMONARY STRESS PHASE 1 BP MMHG: NORMAL MMHG
CARDIOPULMONARY STRESS PHASE 1 BPM: 121 BPM
CARDIOPULMONARY STRESS PHASE 1 SPO2: 100 % SPO2
CARDIOPULMONARY STRESS PHASE 1 TIME SEC: 0 SEC
CARDIOPULMONARY STRESS PHASE 1 TIME: 3 MINS
CARDIOPULMONARY STRESS PHASE 2 BP MMHG: NORMAL MMHG
CARDIOPULMONARY STRESS PHASE 2 BPM: 146 BPM
CARDIOPULMONARY STRESS PHASE 2 SPO2: 99 % SPO2
CARDIOPULMONARY STRESS PHASE 2 TIME SEC: 0 SEC
CARDIOPULMONARY STRESS PHASE 2 TIME: 6 MINS
CARDIOPULMONARY STRESS PHASE 3 BP MMHG: NORMAL MMHG
CARDIOPULMONARY STRESS PHASE 3 BPM: 156 BPM
CARDIOPULMONARY STRESS PHASE 3 SPO2: 100 % SPO2
CARDIOPULMONARY STRESS PHASE 3 TIME SEC: 30 SEC
CARDIOPULMONARY STRESS PHASE 3 TIME: 8 MINS
CARDIOPULMONARY SVC (L) ACTUAL: 3.07
CARDIOPULMONARY SVC (L) PERCENT: 86 %
CARDIOPULMONARY SVC (L) PREDICTED: 3.55
CARDIOPULMONARY TIDAL VOLUME REST: 408 ML
CARDIOPULMONARY TIDAL VOLUME VO2MAX: 1250 ML
CARDIOPULMONARY VE/VCO2 SLOPE: 35.08
CARDIOPULMONARY VENTILATORY EQUIVALENT 02 REST: 36
CARDIOPULMONARY VENTILATORY EQUIVALENT 02 V02: 37
CARDIOPULMONARY VENTILATORY EQUIVALENT C02 REST: 42
CARDIOPULMONARY VENTILATORY EQUIVALENT C02 SLOPE VO2MAX: 35.08
CARDIOPULMONARY VENTILATORY EQUIVALENT C02 VO2MAX: 36
CV STRESS MAX HR HE: 156
LVEF ECHO: NORMAL
PREDICTED VO2MAX: 34.5
RATED PERCEIVED EXERTION: 15
STRESS ANGINA INDEX: 1
STRESS ECHO BASELINE BP: NORMAL MMHG
STRESS ECHO BASELINE HR: 79 BPM
STRESS ECHO CALCULATED PERCENT HR: 80 %
STRESS ECHO LAST STRESS BP: NORMAL MMHG
STRESS ECHO POST ESTIMATED WORKLOAD: 5.8 METS
STRESS ECHO POST EXERCISE DUR MIN: 8 MIN
STRESS ECHO POST EXERCISE DUR SEC: 30 SEC
STRESS ECHO TARGET HR: 194

## 2022-06-27 ENCOUNTER — DOCUMENTATION ONLY (OUTPATIENT)
Dept: CARDIOLOGY | Facility: CLINIC | Age: 27
End: 2022-06-27

## 2022-06-27 NOTE — PROGRESS NOTES
Dear Dr. Mtz,    Ms. Alex Rivas completed her echocardiogram, noninvasive cardiopulmonary testing and EKG.    Her EKG was unremarkable.    Her echocardiogram showed no significant abnormalities.  Her left ventricle was normal in size and function.  Her right ventricle was normal in size and function.  She had no valvular abnormalities.  She had no pericardial effusion.  While the bubble study was reported as faintly positive to my eyes I do not see any significant left-to-right shunting with her bubble study to be concerned for left-to-right shunting.    She had a cardiopulmonary stress testing.  She exercised for 8 minutes and 30 seconds.  She did not achieve anaerobic threshold.  Her RER was 1.08.  Her functional capacity was impaired at functional class II with 50 to 74% predicted.  She had appropriate blood pressure and heart rate response.  Her VO2 was reduced at 20.4 mL/kg/min which is 59% age and gender predicted.  Her VE VCO2 slope was normal.  She had no desaturation during her cardiopulmonary stress testing.      The etiology of her apparent hypoxemia is unclear to me.  Her oxygen saturation was normal at rest and during exercise on her cardiopulmonary stress testing.  Her echocardiogram does not show any significant cardiac abnormality.  There is no evidence of significant left-to-right shunting.  Her reduced functional capacity I suspect is probably due to deconditioning as she did not achieve anaerobic threshold.  She stopped her stress test due to leg pain.  She had no evidence of significant cardiac or respiratory abnormality on her cardiopulmonary stress testing.    I hope this is helpful in her evaluation.  We will be happy to see her on an as-needed basis.    We thank you for involving us in her care.  Please do not hesitate to call us if you have any further questions.  It was a pleasure to be involved in Ms. Alex Rivas's care.    Sincerely,  Alie Strange MD   [facility]  Pulmonary Hypertension  Heart Failure, Transplant, and Mechanical Circulatory Support Cardiology   Cardiovascular Division  [facility]   [phone]

## 2022-06-27 NOTE — LETTER
6/27/2022      RE: Alex Rivas  [address] [facility] [address]       Dear Dr. Mtz,    Ms. Alex Rivas completed her echocardiogram, noninvasive cardiopulmonary testing and EKG.    Her EKG was unremarkable.    Her echocardiogram showed no significant abnormalities.  Her left ventricle was normal in size and function.  Her right ventricle was normal in size and function.  She had no valvular abnormalities.  She had no pericardial effusion.  While the bubble study was reported as faintly positive to my eyes I do not see any significant left-to-right shunting with her bubble study to be concerned for left-to-right shunting.    She had a cardiopulmonary stress testing.  She exercised for 8 minutes and 30 seconds.  She did not achieve anaerobic threshold.  Her RER was 1.08.  Her functional capacity was impaired at functional class II with 50 to 74% predicted.  She had appropriate blood pressure and heart rate response.  Her VO2 was reduced at 20.4 mL/kg/min which is 59% age and gender predicted.  Her VE VCO2 slope was normal.  She had no desaturation during her cardiopulmonary stress testing.      The etiology of her apparent hypoxemia is unclear to me.  Her oxygen saturation was normal at rest and during exercise on her cardiopulmonary stress testing.  Her echocardiogram does not show any significant cardiac abnormality.  There is no evidence of significant left-to-right shunting.  Her reduced functional capacity I suspect is probably due to deconditioning as she did not achieve anaerobic threshold.  She stopped her stress test due to leg pain.  She had no evidence of significant cardiac or respiratory abnormality on her cardiopulmonary stress testing.    I hope this is helpful in her evaluation.  We will be happy to see her on an as-needed basis.    We thank you for involving us in her care.  Please do not hesitate to call us if you have any further questions.  It was a pleasure to be  involved in Ms. Alex Rivas's care.    Sincerely,  Alie Strange MD   [facility]  Heart Failure, Transplant, and Mechanical Circulatory Support Cardiology   Cardiovascular Division  [facility]   [phone]

## 2022-07-18 ENCOUNTER — ANCILLARY PROCEDURE (OUTPATIENT)
Dept: GENERAL RADIOLOGY | Facility: CLINIC | Age: 27
End: 2022-07-18
Attending: INTERNAL MEDICINE
Payer: COMMERCIAL

## 2022-07-18 DIAGNOSIS — R07.89 CHEST TIGHTNESS: ICD-10-CM

## 2022-07-18 DIAGNOSIS — R06.09 DOE (DYSPNEA ON EXERTION): ICD-10-CM

## 2022-07-18 PROCEDURE — 71046 X-RAY EXAM CHEST 2 VIEWS: CPT | Performed by: RADIOLOGY

## 2022-07-20 ENCOUNTER — PRE VISIT (OUTPATIENT)
Dept: PULMONOLOGY | Facility: CLINIC | Age: 27
End: 2022-07-20

## 2022-07-20 ENCOUNTER — OFFICE VISIT (OUTPATIENT)
Dept: PULMONOLOGY | Facility: CLINIC | Age: 27
End: 2022-07-20
Attending: INTERNAL MEDICINE
Payer: COMMERCIAL

## 2022-07-20 VITALS
WEIGHT: 195 LBS | OXYGEN SATURATION: 99 % | SYSTOLIC BLOOD PRESSURE: 108 MMHG | RESPIRATION RATE: 17 BRPM | HEIGHT: 64 IN | BODY MASS INDEX: 33.29 KG/M2 | DIASTOLIC BLOOD PRESSURE: 76 MMHG | HEART RATE: 79 BPM

## 2022-07-20 DIAGNOSIS — R07.89 CHEST TIGHTNESS: ICD-10-CM

## 2022-07-20 DIAGNOSIS — R09.02 HYPOXEMIA: ICD-10-CM

## 2022-07-20 DIAGNOSIS — R06.09 DOE (DYSPNEA ON EXERTION): ICD-10-CM

## 2022-07-20 DIAGNOSIS — R06.09 DYSPNEA ON EXERTION: ICD-10-CM

## 2022-07-20 PROCEDURE — G0463 HOSPITAL OUTPT CLINIC VISIT: HCPCS | Mod: 25

## 2022-07-20 PROCEDURE — 99204 OFFICE O/P NEW MOD 45 MIN: CPT | Mod: 25 | Performed by: INTERNAL MEDICINE

## 2022-07-20 PROCEDURE — 94729 DIFFUSING CAPACITY: CPT | Performed by: INTERNAL MEDICINE

## 2022-07-20 PROCEDURE — 94726 PLETHYSMOGRAPHY LUNG VOLUMES: CPT | Performed by: INTERNAL MEDICINE

## 2022-07-20 PROCEDURE — 94375 RESPIRATORY FLOW VOLUME LOOP: CPT | Performed by: INTERNAL MEDICINE

## 2022-07-20 ASSESSMENT — ENCOUNTER SYMPTOMS
HEMOPTYSIS: 1
SHORTNESS OF BREATH: 1
COUGH: 1
HEARTBURN: 1
CHILLS: 0
WHEEZING: 1
VOMITING: 0
SPUTUM PRODUCTION: 1
NAUSEA: 0

## 2022-07-20 ASSESSMENT — PAIN SCALES - GENERAL: PAINLEVEL: EXTREME PAIN (8)

## 2022-07-20 NOTE — NURSING NOTE
Chief Complaint   Patient presents with     Consult     New OGDEN, Chest tightness, hypoxemia    Medications reviewed and vital signs taken.   Elyse Torres [facility]

## 2022-07-20 NOTE — PROGRESS NOTES
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    Was seen at the [facility] at some point and diagnosed with visceral sensitivity and fibromyalgia.  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 Beck for chronic Lyme.  She has undergone prolonged therapy with dapsone, doxycycline, and rifampin.    She reports that it one-point her oxygen saturations at home were in the mid 80s prompting her to present to the emergency department, however on arrival to the ER her sats were in the 90s.  She has recurrent issues with chest tightness and dyspnea with activity, though this has somewhat improved over the past few months.  She experiences dyspnea with minimal activity such as 1 flight of stairs or even walking at a normal pace on flat ground.  She can complete a flight of stairs but has to stop and catch her breath at the top.  Despite her dyspnea she tries to exercise 3 times a week lifting weights and walking.    She denies any asthma type symptoms or respiratory issues prior to approximately 5 years ago.  Despite her dyspnea on exertion she does not have any coughing or wheezing.  She does report occasional night sweats.    She previously had significant trouble with nasal congestion and sinus pressure.  Was evaluated by ENT and told she has a deviated septum.  She also suffers from frequent sinus infections through her late teens but feels that this has significantly improved over the past few years.    She does have occasional GERD  which is well treated with Prevacid.    She has had issues with lower extremity edema in the past.  She currently has no edema but shows pictures from December of her ankles which have completely lost definition appearing consistent with 2-3+ edema.      She reports gluten intolerance, though per chart review evaluation for celiac disease has been negative.        Exposure History:   Tobacco: Never  Other inhaled substances (Vaping, hookah. 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  Pets: Two dogs  Allergies: Glutin, dairy, weeds, trees, grass   Travel: [place] annually            Past Medical History:     Past Medical History:   Diagnosis Date     Gluten intolerance      Lyme disease      Migraine     Followed by [facility]     Scoliosis      Spondylolisthesis     evelyn Winslow second degree AV block     during sleep             Past Surgical History:      Past Surgical History:   Procedure Laterality Date     Colonoscopy and EGD  2011    gastris, otherwise normal     wisdom teeth               Social History:     Social History     Socioeconomic History     Marital status: Single     Spouse name: Not on file     Number of children: Not on file     Years of education: Not on file     Highest education level: Not on file   Occupational History     Not on file   Tobacco Use     Smoking status: Never Smoker     Smokeless tobacco: Never Used   Substance and Sexual Activity     Alcohol use: No     Alcohol/week: 0.0 standard drinks     Drug use: No     Sexual activity: Never   Other Topics Concern     Parent/sibling w/ CABG, MI or angioplasty before 65F 55M? Not Asked   Social History Narrative     Not on file     Social Determinants of Health     Financial Resource Strain: Not on file   Food Insecurity: Not on file   Transportation Needs: Not on file   Physical Activity: Not on file   Stress: Not on file   Social Connections: Not on file   Intimate Partner Violence: Not on file    Housing Stability: Not on file            Family History:     Family History   Problem Relation Age of Onset     Hypertension Mother      Breast Cancer Paternal Aunt      Cancer Paternal Grandmother      Heart Disease Paternal Grandmother      Hypertension Father              Immunizations:     Immunization History   Administered Date(s) Administered     HEPA 04/02/2007, 01/14/2008     HPV 05/01/2012, 06/20/2013, 12/23/2013     Influenza (IIV3) PF 10/02/2015     Meningococcal (Menomune ) 05/01/2012     Pneumococcal 23 valent 07/21/2014     Tdap (Adacel,Boostrix) 04/02/2007     Varicella 04/02/2007             Allergies:     Allergies   Allergen Reactions     Dairy Products [Milk Protein Extract] GI Disturbance     Gluten Meal Anaphylaxis     Other reaction(s): GI Upset     Amoxicillin Diarrhea and Nausea and Vomiting             Medications:   acetaminophen (TYLENOL) 325 MG tablet, Take 325-650 mg by mouth every 6 hours as needed for mild pain  diphenhydrAMINE (BENADRYL) 25 MG capsule, Take 25-50 mg by mouth nightly as needed for itching or allergies   eszopiclone (LUNESTA) 2 MG tablet, Take 2 mg by mouth at bedtime as needed, may repeat once for sleep  FLUoxetine (PROZAC) 10 MG capsule, Take 30 mg by mouth daily  gabapentin (NEURONTIN) 100 MG capsule, Take 200 mg by mouth At Bedtime  melatonin 3 MG tablet, Take 6 mg by mouth At Bedtime  ondansetron (ZOFRAN ODT) 4 MG ODT tab, Take 1 tablet (4 mg) by mouth every 6 hours as needed  ondansetron (ZOFRAN-ODT) 8 MG ODT tab, Take 8 mg by mouth every 8 hours as needed for nausea    No current facility-administered medications on file prior to visit.           Review of Systems:     Review of Systems   Constitutional: Positive for malaise/fatigue. Negative for chills.   Respiratory: Positive for cough, hemoptysis, sputum production, shortness of breath and wheezing.    Gastrointestinal: Positive for heartburn. Negative for nausea and vomiting.              Exam:   BP  "108/76   Pulse 79   Resp 17   Ht 1.626 m (5' 4\")   Wt 88.5 kg (195 lb)   SpO2 99%   BMI 33.47 kg/m      Physical Exam  Vitals and nursing note reviewed.   Constitutional:       Appearance: She is obese.   Cardiovascular:      Rate and Rhythm: Normal rate and regular rhythm.      Heart sounds: No murmur heard.  Pulmonary:      Effort: Pulmonary effort is normal.      Breath sounds: Normal breath sounds. No wheezing, rhonchi or rales.   Musculoskeletal:         General: Normal range of motion.      Right lower leg: No edema.      Left lower leg: No edema.   Skin:     General: Skin is warm and dry.   Neurological:      General: No focal deficit present.      Mental Status: She is alert and oriented to person, place, and time.       Fingernails without clubbing         Data:     PFT: 7/20/2022    The FVC, FEV1, and FEV1/FVC ratio within normal limits.  The lung volumes are within normal limits.  The diffusion capacity within normal limits.      IMPRESSION:  Normal spirometry, lung volumes, and diffusion capacity      Cardiopulmonary stress test 6/21/2022     A cardiopulmonary stress test was performed following a Josh ramp protocol with the patient exercising for 8 minutes and 30 seconds under the supervision of Dr. Elena Candelario.  Blood pressure demonstrated a normal response to exercise.  Heart rate demonstrated a blunted response to exercise. The patient's exercise capacity is severely impaired. The test was stopped due to leg fatigue. The patient reported 3/10 chest tightness, 3/10 dyspnea and 3/10 light-headedness during the stress test. Functional capacity response is Class II: 50-74%. The patient experienced non-limiting angina during the test.     The stress electrocardiogram is negative for inducible ischemic EKG changes.     There is no prior study for comparison.     The patient's exercise capacity is severely impaired.     The stress electrocardiogram is negative for inducible ischemic EKG " changes.        CXR 7/18/22  WNL      CXR 3/28/20  WNL      Holter monitor 11/7/2017  Second-degree block type I      PFTs, imaging studies, and stress test listed here were independently reviewed and interpreted by me today.          Assessment and Plan:     ## Dyspnea on exertion  Likely secondary to obesity and deconditioning.  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  Has previously been treated with long triple therapy with dapsone, doxycycline, and rifampin.  Possibly experienced 1 episode of hypoxia at home, but had normal sats when she arrived to the ER.  Underwent cardiopulmonary exercise stress testing with normal oxygenation and cardiac function, though there was a questionable blunted heart rate response.  In summary this is thought consistent with deconditioning.  PFTs within normal limits including diffusion capacity. Has not experienced any wheezing or coughing to suggest asthma.  At this point I recommend increasing activity to address her deconditioning.          Return to clinic: RAUL Lyon M.D.  Pulmonary & Critical Care  Pager: Click Here to page      The above note was dictated using voice recognition software and may include typographical errors. Please contact the author for any clarifications.

## 2022-07-21 LAB
DLCOUNC-%PRED-PRE: 92 %
DLCOUNC-PRE: 19.32 ML/MIN/MMHG
DLCOUNC-PRED: 20.93 ML/MIN/MMHG
ERV-%PRED-PRE: 58 %
ERV-PRE: 0.44 L
ERV-PRED: 0.76 L
EXPTIME-PRE: 5.66 SEC
FEF2575-%PRED-PRE: 98 %
FEF2575-PRE: 3.49 L/SEC
FEF2575-PRED: 3.54 L/SEC
FEFMAX-%PRED-PRE: 114 %
FEFMAX-PRE: 7.63 L/SEC
FEFMAX-PRED: 6.66 L/SEC
FEV1-%PRED-PRE: 89 %
FEV1-PRE: 2.72 L
FEV1FEV6-PRE: 87 %
FEV1FEV6-PRED: 86 %
FEV1FVC-PRE: 87 %
FEV1FVC-PRED: 86 %
FEV1SVC-PRE: 86 %
FEV1SVC-PRED: 82 %
FIFMAX-PRE: 6.15 L/SEC
FRCPLETH-%PRED-PRE: 62 %
FRCPLETH-PRE: 1.61 L
FRCPLETH-PRED: 2.55 L
FVC-%PRED-PRE: 88 %
FVC-PRE: 3.12 L
FVC-PRED: 3.55 L
IC-%PRED-PRE: 92 %
IC-PRE: 2.73 L
IC-PRED: 2.95 L
RVPLETH-%PRED-PRE: 90 %
RVPLETH-PRE: 1.16 L
RVPLETH-PRED: 1.28 L
TLCPLETH-%PRED-PRE: 94 %
TLCPLETH-PRE: 4.34 L
TLCPLETH-PRED: 4.6 L
VA-%PRED-PRE: 88 %
VA-PRE: 3.91 L
VC-%PRED-PRE: 85 %
VC-PRE: 3.17 L
VC-PRED: 3.71 L

## 2022-09-10 ENCOUNTER — HEALTH MAINTENANCE LETTER (OUTPATIENT)
Age: 27
End: 2022-09-10

## 2023-06-03 ENCOUNTER — HEALTH MAINTENANCE LETTER (OUTPATIENT)
Age: 28
End: 2023-06-03

## 2024-01-29 ENCOUNTER — MEDICAL CORRESPONDENCE (OUTPATIENT)
Dept: HEALTH INFORMATION MANAGEMENT | Facility: CLINIC | Age: 29
End: 2024-01-29
Payer: COMMERCIAL

## 2024-01-30 ENCOUNTER — TRANSCRIBE ORDERS (OUTPATIENT)
Dept: OTHER | Age: 29
End: 2024-01-30

## 2024-01-30 DIAGNOSIS — H53.8 BLURRED VISION: ICD-10-CM

## 2024-01-30 DIAGNOSIS — G43.909 MIGRAINE: Primary | ICD-10-CM

## 2024-03-07 NOTE — TELEPHONE ENCOUNTER
Action Delma Marrufo on 3/7/2024 at 12:11 PM   Action Taken Attempted to call Pt to ask if any imaging . No answer, left VM. -REID     Action    Action Taken 3/28/2024 8:49AM KERENAN     I called pt Alex - unavailable. I didn't leave a vm.      RECORDS RECEIVED FROM: Care Everywhere   REASON FOR VISIT: Migraine/Blurred vision   PROVIDER: Rebeca Morel MD   DATE OF APPT: 4/29/24 @ 4:00 pm    NOTES (FOR ALL VISITS) STATUS DETAILS   OFFICE NOTE from referring provider Received 1/30/24 (Transcribed Orders) Emeka Mtz MD @[facility]     DISCHARGE REPORT from the ER Care Everywhere 1/25/24 Blade Garcia MD  [facility]     MEDICATION LIST Internal    IMAGING  (FOR ALL VISITS)     MRI (HEAD, NECK, SPINE) N/A    CT (HEAD, NECK, SPINE) N/A

## 2024-03-13 ENCOUNTER — TELEPHONE (OUTPATIENT)
Dept: CARDIOLOGY | Facility: CLINIC | Age: 29
End: 2024-03-13
Payer: COMMERCIAL

## 2024-03-13 NOTE — TELEPHONE ENCOUNTER
Left Voicemail (1st Attempt) and Sent Mychart (1st Attempt) for the patient to call back and reschedule the following:    Appointment type: New Cardiology (Pt has not seen cardiology before and return cardiology w/ Kathy Moreno should be cancelled)  Provider: Any gen card MD  Return date: Next available  Specialty phone number: [phone] option 1  Additional appointment(s) needed: NA  Additonal Notes: 3/13 LVM and MYC for pt to reschedule Return Cardiology w/ Kathy Moreno to New Cardiology w/ gen cervantes MD. Pt has not seen cardiology before and will need to see an MD and Kathy is a NP. MJ

## 2024-03-13 NOTE — TELEPHONE ENCOUNTER
----- Message from Va Moreno CMA sent at 3/13/2024  4:15 PM CDT -----  Regarding: FW: reschedule  Patient needs to establish with General cardiologist  ----- Message -----  From: Kathy Moreno NP  Sent: 3/13/2024   3:33 PM CDT  To: Va Moreno CMA  Subject: reschedule                                       Patient needs to reschedule with MD. Has not seen a cardiologist in our system - looks like cami signed his note in 2022 as error? Prior to that last seen in 2017

## 2024-03-19 ENCOUNTER — OFFICE VISIT (OUTPATIENT)
Dept: CARDIOLOGY | Facility: CLINIC | Age: 29
End: 2024-03-19
Payer: COMMERCIAL

## 2024-03-19 VITALS
BODY MASS INDEX: 30.56 KG/M2 | OXYGEN SATURATION: 99 % | HEART RATE: 67 BPM | WEIGHT: 179 LBS | HEIGHT: 64 IN | SYSTOLIC BLOOD PRESSURE: 115 MMHG | DIASTOLIC BLOOD PRESSURE: 83 MMHG

## 2024-03-19 DIAGNOSIS — R00.2 PALPITATIONS: Primary | ICD-10-CM

## 2024-03-19 PROCEDURE — 99204 OFFICE O/P NEW MOD 45 MIN: CPT | Performed by: INTERNAL MEDICINE

## 2024-03-19 PROCEDURE — 93000 ELECTROCARDIOGRAM COMPLETE: CPT | Performed by: INTERNAL MEDICINE

## 2024-03-19 NOTE — LETTER
3/19/2024    Emeka Mtz MD  [address] [address]    RE: Alex Rivas       Dear Colleague,     I had the pleasure of seeing Alex Rivas in the [facility].  HPI and Plan:   Ms Rivas is a very pleasant 28-year-old female who is here for evaluation of heart rate issues.  To be noted patient has seen in past by Dr. Rubio and Dr. Strange for palpitation and was found to have some Wenckebach on Holter evaluation echocardiogram showed normal LV function without any significant valvular abnormalities possible mildly positive bubble study.  Patient tells me that over last several months when she tries to be active her heart rate jumps up by 30 to 40 bpm.  Then in next 10 minutes it comes down.  She is not been very active in terms of physical activity recently.  She had an exercise stress EKG done a few years ago without any evidence of inducible ischemia.  EKG today shows sinus rhythm with left intrafascicular block similar to previous EKG.  Patient tells me that she has chronic Lyme disease, migraine, idiopathic mast cell activation syndrome.  No chest discomfort particular shortness of breath dizziness presyncope or syncope.  Patient tells me that in the past she has also been diagnosed with POTS syndrome.  Although to be noted the rapid heart rate does not happen in changes posterior now but more so with physical activity.      Assessment and plan  Longstanding history of palpitation now over the last several months of disproportionate increase in heart rate noted with physical activity.  This could be due to deconditioning.  Does not appear to have any significant pallor on examination.  Recommend Zio patch monitor for 1 week.  Also recommend echocardiogram    Recommendations  Echocardiogram  7 days of Zio patch monitor  Tentative follow-up with NAHUN in about 6 weeks time.  My office going to update her with the results of this test if no significant  valve disease seen we can see her on as-needed basis.    Today's clinic visit entailed:  Review of the result(s) of each unique test - EKG, echocardiogram        Orders Placed This Encounter   Procedures    Follow-Up with Cardiology NAHUN    EKG 12-lead complete w/read - Clinics (performed today)    Echocardiogram Complete       No orders of the defined types were placed in this encounter.      There are no discontinued medications.      Encounter Diagnosis   Name Primary?    Palpitations Yes       CURRENT MEDICATIONS:  Current Outpatient Medications   Medication Sig Dispense Refill    acetaminophen (TYLENOL) 325 MG tablet Take 325-650 mg by mouth every 6 hours as needed for mild pain      diphenhydrAMINE (BENADRYL) 25 MG capsule Take 25-50 mg by mouth nightly as needed for itching or allergies       eszopiclone (LUNESTA) 2 MG tablet Take 2 mg by mouth at bedtime as needed, may repeat once for sleep      FLUoxetine (PROZAC) 10 MG capsule Take 30 mg by mouth daily      gabapentin (NEURONTIN) 100 MG capsule Take 200 mg by mouth At Bedtime      melatonin 3 MG tablet Take 6 mg by mouth At Bedtime      ondansetron (ZOFRAN ODT) 4 MG ODT tab Take 1 tablet (4 mg) by mouth every 6 hours as needed 20 tablet 0    ondansetron (ZOFRAN-ODT) 8 MG ODT tab Take 8 mg by mouth every 8 hours as needed for nausea         ALLERGIES     Allergies   Allergen Reactions    Dairy Products [Milk Protein] GI Disturbance    Gluten Meal Anaphylaxis     Other reaction(s): GI Upset    Amoxicillin Diarrhea and Nausea and Vomiting       PAST MEDICAL HISTORY:  Past Medical History:   Diagnosis Date    Gluten intolerance     Lyme disease     Migraine     Followed by [facility]    Scoliosis     Spondylolisthesis     evelyn Winslow second degree AV block     during sleep       PAST SURGICAL HISTORY:  Past Surgical History:   Procedure Laterality Date    Colonoscopy and EGD  2011    gastris, otherwise normal    wisdom teeth         FAMILY  "HISTORY:  Family History   Problem Relation Age of Onset    Hypertension Mother     Breast Cancer Paternal Aunt     Cancer Paternal Grandmother     Heart Disease Paternal Grandmother     Hypertension Father        SOCIAL HISTORY:  Social History     Socioeconomic History    Marital status: Single     Spouse name: None    Number of children: None    Years of education: None    Highest education level: None   Tobacco Use    Smoking status: Never    Smokeless tobacco: Never   Substance and Sexual Activity    Alcohol use: No     Alcohol/week: 0.0 standard drinks of alcohol    Drug use: No    Sexual activity: Never       Review of Systems:  Skin:          Eyes:         ENT:         Respiratory:          Cardiovascular:         Gastroenterology:        Genitourinary:         Musculoskeletal:         Neurologic:         Psychiatric:         Heme/Lymph/Imm:         Endocrine:           Physical Exam:  Vitals: /83 (BP Location: Left arm, Patient Position: Sitting)   Pulse 67   Ht 1.626 m (5' 4\")   Wt 81.2 kg (179 lb)   SpO2 99%   BMI 30.73 kg/m      General patient appears comfortable  Neck normal JVP  Cardiovascular system S1-S2 normal no murmur rub or gallop  Respite system clear to auscultation  Extremities no edema    CC  Referred Self,       Thank you for allowing me to participate in the care of your patient.      Sincerely,     Morgan Vasquez MD     [facility]  "

## 2024-03-19 NOTE — PROGRESS NOTES
HPI and Plan:   Ms Rivas is a very pleasant 28-year-old female who is here for evaluation of heart rate issues.  To be noted patient has seen in past by Dr. Rubio and Dr. Strange for palpitation and was found to have some Wenckebach on Holter evaluation echocardiogram showed normal LV function without any significant valvular abnormalities possible mildly positive bubble study.  Patient tells me that over last several months when she tries to be active her heart rate jumps up by 30 to 40 bpm.  Then in next 10 minutes it comes down.  She is not been very active in terms of physical activity recently.  She had an exercise stress EKG done a few years ago without any evidence of inducible ischemia.  EKG today shows sinus rhythm with left intrafascicular block similar to previous EKG.  Patient tells me that she has chronic Lyme disease, migraine, idiopathic mast cell activation syndrome.  No chest discomfort particular shortness of breath dizziness presyncope or syncope.  Patient tells me that in the past she has also been diagnosed with POTS syndrome.  Although to be noted the rapid heart rate does not happen in changes posterior now but more so with physical activity.      Assessment and plan  Longstanding history of palpitation now over the last several months of disproportionate increase in heart rate noted with physical activity.  This could be due to deconditioning.  Does not appear to have any significant pallor on examination.  Recommend Zio patch monitor for 1 week.  Also recommend echocardiogram    Recommendations  Echocardiogram  7 days of Zio patch monitor  Tentative follow-up with NAHUN in about 6 weeks time.  My office going to update her with the results of this test if no significant valve disease seen we can see her on as-needed basis.    Today's clinic visit entailed:  Review of the result(s) of each unique test - EKG, echocardiogram        Orders Placed This Encounter   Procedures    Follow-Up with  Cardiology NAHUN    EKG 12-lead complete w/read - Clinics (performed today)    Echocardiogram Complete       No orders of the defined types were placed in this encounter.      There are no discontinued medications.      Encounter Diagnosis   Name Primary?    Palpitations Yes       CURRENT MEDICATIONS:  Current Outpatient Medications   Medication Sig Dispense Refill    acetaminophen (TYLENOL) 325 MG tablet Take 325-650 mg by mouth every 6 hours as needed for mild pain      diphenhydrAMINE (BENADRYL) 25 MG capsule Take 25-50 mg by mouth nightly as needed for itching or allergies       eszopiclone (LUNESTA) 2 MG tablet Take 2 mg by mouth at bedtime as needed, may repeat once for sleep      FLUoxetine (PROZAC) 10 MG capsule Take 30 mg by mouth daily      gabapentin (NEURONTIN) 100 MG capsule Take 200 mg by mouth At Bedtime      melatonin 3 MG tablet Take 6 mg by mouth At Bedtime      ondansetron (ZOFRAN ODT) 4 MG ODT tab Take 1 tablet (4 mg) by mouth every 6 hours as needed 20 tablet 0    ondansetron (ZOFRAN-ODT) 8 MG ODT tab Take 8 mg by mouth every 8 hours as needed for nausea         ALLERGIES     Allergies   Allergen Reactions    Dairy Products [Milk Protein] GI Disturbance    Gluten Meal Anaphylaxis     Other reaction(s): GI Upset    Amoxicillin Diarrhea and Nausea and Vomiting       PAST MEDICAL HISTORY:  Past Medical History:   Diagnosis Date    Gluten intolerance     Lyme disease     Migraine     Followed by [facility]    Scoliosis     Spondylolisthesis     chiro    Nayla second degree AV block     during sleep       PAST SURGICAL HISTORY:  Past Surgical History:   Procedure Laterality Date    Colonoscopy and EGD  2011    gastris, otherwise normal    wisdom teeth         FAMILY HISTORY:  Family History   Problem Relation Age of Onset    Hypertension Mother     Breast Cancer Paternal Aunt     Cancer Paternal Grandmother     Heart Disease Paternal Grandmother     Hypertension Father        SOCIAL  "HISTORY:  Social History     Socioeconomic History    Marital status: Single     Spouse name: None    Number of children: None    Years of education: None    Highest education level: None   Tobacco Use    Smoking status: Never    Smokeless tobacco: Never   Substance and Sexual Activity    Alcohol use: No     Alcohol/week: 0.0 standard drinks of alcohol    Drug use: No    Sexual activity: Never       Review of Systems:  Skin:          Eyes:         ENT:         Respiratory:          Cardiovascular:         Gastroenterology:        Genitourinary:         Musculoskeletal:         Neurologic:         Psychiatric:         Heme/Lymph/Imm:         Endocrine:           Physical Exam:  Vitals: /83 (BP Location: Left arm, Patient Position: Sitting)   Pulse 67   Ht 1.626 m (5' 4\")   Wt 81.2 kg (179 lb)   SpO2 99%   BMI 30.73 kg/m      General patient appears comfortable  Neck normal JVP  Cardiovascular system S1-S2 normal no murmur rub or gallop  Respite system clear to auscultation  Extremities no edema    CC  Referred Self, MD  No address on file                "

## 2024-03-20 ENCOUNTER — ORDERS ONLY (AUTO-RELEASED) (OUTPATIENT)
Dept: CARDIOLOGY | Facility: CLINIC | Age: 29
End: 2024-03-20
Payer: COMMERCIAL

## 2024-03-20 DIAGNOSIS — R00.2 PALPITATIONS: ICD-10-CM

## 2024-04-11 PROCEDURE — 93248 EXT ECG>7D<15D REV&INTERPJ: CPT | Performed by: INTERNAL MEDICINE

## 2024-04-12 ENCOUNTER — TELEPHONE (OUTPATIENT)
Dept: CARDIOLOGY | Facility: CLINIC | Age: 29
End: 2024-04-12
Payer: COMMERCIAL

## 2024-04-12 DIAGNOSIS — I44.39 HIGH-GRADE ATRIOVENTRICULAR BLOCK: ICD-10-CM

## 2024-04-12 DIAGNOSIS — R00.2 PALPITATIONS: Primary | ICD-10-CM

## 2024-04-12 DIAGNOSIS — I44.1 HEART BLOCK AV SECOND DEGREE: ICD-10-CM

## 2024-04-12 NOTE — TELEPHONE ENCOUNTER
"Team received information about the results of Alex's Ziopatch rhythm monitor.  Per Dr. Estrada:    Agree with findings  Symptoms (76 episodes) reported were mostly related to sinus rhythm ( bpm)  Two episodes of high grade AV block occurred around 840 pm not associated with any symptoms    In review of the results, she had several \"episodes\" where her heart rate was between 100 - 120's, many of these were when she pushed the trigger button.  Some activity was walking, or cleaning, and some was just standing or sitting quietly.   These findings are similar to past studies.    Alex has Echocardiogram scheduled later this month and an appointment with Ara Hamilton in June.   Will wait to hear from Dr. Vasquez before we notify patient.    Ankita Allison RN on 4/12/2024 at 8:52 AM                "

## 2024-04-15 NOTE — TELEPHONE ENCOUNTER
"Writekristine called and spoke with Alex.  She is agreeable to the specialist EP referral.   She said when she read the report on MyChart, she wondered if Dr. Vasquez would do more investigating for the \"high grade AV block\".     has entered the order for the EP referral.   Informed Alex that our EP  will call her to get an appointment set up.   Alex verbalized agreement.    Ankita Allison RN on 4/15/2024 at 1:56 PM    "

## 2024-04-26 ENCOUNTER — HOSPITAL ENCOUNTER (OUTPATIENT)
Dept: CARDIOLOGY | Facility: CLINIC | Age: 29
Discharge: HOME | End: 2024-04-26
Attending: INTERNAL MEDICINE | Admitting: INTERNAL MEDICINE
Payer: COMMERCIAL

## 2024-04-26 DIAGNOSIS — R00.2 PALPITATIONS: ICD-10-CM

## 2024-04-26 LAB — LVEF ECHO: NORMAL

## 2024-04-26 PROCEDURE — 255N000002 HC RX 255 OP 636: Performed by: INTERNAL MEDICINE

## 2024-04-26 PROCEDURE — C8929 TTE W OR WO FOL WCON,DOPPLER: HCPCS

## 2024-04-26 PROCEDURE — 93306 TTE W/DOPPLER COMPLETE: CPT | Mod: 26 | Performed by: INTERNAL MEDICINE

## 2024-04-26 RX ADMIN — HUMAN ALBUMIN MICROSPHERES AND PERFLUTREN 3 ML: 10; .22 INJECTION, SOLUTION INTRAVENOUS at 15:54

## 2024-04-29 ENCOUNTER — TELEPHONE (OUTPATIENT)
Dept: CARDIOLOGY | Facility: CLINIC | Age: 29
End: 2024-04-29

## 2024-04-29 ENCOUNTER — PRE VISIT (OUTPATIENT)
Dept: NEUROLOGY | Facility: CLINIC | Age: 29
End: 2024-04-29

## 2024-04-29 NOTE — TELEPHONE ENCOUNTER
"Team received results from the bubble study Echo:    \"negative for flow  across the interatrial septum.\"    Upcoming apmnt with EP Dr. Quintana 5\1, follow up with Ara Hamilton in June.    Routing to Dr. Vasquez for FYI.    Ankita Allison RN on 4/29/2024 at 12:25 PM    "

## 2024-05-01 ENCOUNTER — OFFICE VISIT (OUTPATIENT)
Dept: CARDIOLOGY | Facility: CLINIC | Age: 29
End: 2024-05-01
Payer: COMMERCIAL

## 2024-05-01 VITALS
BODY MASS INDEX: 30.11 KG/M2 | HEART RATE: 92 BPM | SYSTOLIC BLOOD PRESSURE: 115 MMHG | DIASTOLIC BLOOD PRESSURE: 80 MMHG | OXYGEN SATURATION: 99 % | WEIGHT: 176.4 LBS | HEIGHT: 64 IN

## 2024-05-01 DIAGNOSIS — I44.39 HIGH-GRADE ATRIOVENTRICULAR BLOCK: Primary | ICD-10-CM

## 2024-05-01 DIAGNOSIS — R00.0 SINUS TACHYCARDIA: ICD-10-CM

## 2024-05-01 DIAGNOSIS — R00.2 PALPITATIONS: ICD-10-CM

## 2024-05-01 PROCEDURE — 99214 OFFICE O/P EST MOD 30 MIN: CPT | Performed by: INTERNAL MEDICINE

## 2024-05-01 RX ORDER — FAMOTIDINE 20 MG/1
20 TABLET, FILM COATED ORAL DAILY
COMMUNITY
Start: 2023-11-21

## 2024-05-01 RX ORDER — DEXTROAMPHETAMINE SACCHARATE, AMPHETAMINE ASPARTATE, DEXTROAMPHETAMINE SULFATE AND AMPHETAMINE SULFATE 1.25; 1.25; 1.25; 1.25 MG/1; MG/1; MG/1; MG/1
5 TABLET ORAL DAILY PRN
COMMUNITY
Start: 2024-04-03 | End: 2024-07-29

## 2024-05-01 RX ORDER — TOPIRAMATE 100 MG/1
100 TABLET, FILM COATED ORAL AT BEDTIME
COMMUNITY
End: 2024-07-29

## 2024-05-01 RX ORDER — CLOTRIMAZOLE AND BETAMETHASONE DIPROPIONATE 10; .64 MG/G; MG/G
CREAM TOPICAL PRN
COMMUNITY
Start: 2023-12-14

## 2024-05-01 RX ORDER — COVID-19 ANTIGEN TEST
KIT MISCELLANEOUS PRN
COMMUNITY

## 2024-05-01 RX ORDER — SEMAGLUTIDE 0.68 MG/ML
0.5 INJECTION, SOLUTION SUBCUTANEOUS
COMMUNITY

## 2024-05-01 RX ORDER — CETIRIZINE HYDROCHLORIDE 10 MG/1
10 TABLET ORAL DAILY
COMMUNITY
Start: 2023-11-21

## 2024-05-01 NOTE — LETTER
"5/1/2024    Emeka Mtz MD  [address] [address]    RE: Alex Mccannferminkristine       Dear Colleague,     I had the pleasure of seeing Alex Rivas in the [facility].  PHYSICIAN NOTE:  This visit was completed in person at the [facility].      I had the pleasure of seeing Ms. Alex Rivas for evaluation of bradycardia. She was referred to EP by Dr. Morgan Vasquez. Alex was accompanied by her mother today.    Very pleasant 28-year-old female with h/o nocturnal second-degree block that was documented in 2017. At the time, she was experiencing palpitation prompting evaluation with a Holter. She saw my colleague Dr. Joni Rubio who commented she had second-degree Wenckebach AV block at night, but without symptoms to suggest symptomatic bradycardia. At the time she had a normal exam, ECG and echocardiogram. She was reassured.    Her history of medical illness starts in 2011 with various symptoms prompting visits to several specialists over the years. Alex tells me that since her teenage years she has felt uncomfortable during exertion and this has only gotten worse over time. It was not until 2017 one she was diagnosed with chronic Lyme's disease and subsequently with other tickborne illnesses.    In 06/2022 the patient completed a cardiopulmonary stress test at [facility]. She exercised for 8:30 minutes on a Josh protocol, achieving a peak heart rate of 156 bpm. The formal test interpretation described \"severe exercise impairment\".    She developed severe breathlessness during mild physical activity and regular exercise not possible. She often feels her heart race or pound. She has not had eric syncope. She does have occasional lightheadedness.    She recently completed a 7-day cardiac monitor which I reviewed. It showed episodes of high degree AV block around 8:46 to 9 PM on March 28, heart rate abruptly dropped in to the 30s. The patient activated " "the monitor on numerous occasions, always correlating with sinus rhythm.    Alex is engaged. He is a non-smoker and does not drink alcohol.      PHYSICAL EXAMINATION:  Vital signs: 115/83, 100, 81.2 kg, BMI 30.7. Heart rate at rest 100 - 110. After 5 minutes of standing, her heart rate increased to 118.  General:  in no apparent distress  ENT/Mouth:  no nasal discharge.  Eyes:  normal conjunctivae.   Neck:  no thyromegaly or lymphadenopathy.  Chest/Lungs:  patient is not dyspneic.  Lungs CTA, without rales or wheezing  Cardiovascular:  nl JVP, rhythm is regular.  No gallop, murmur or rub.    Abdomen:  no abdominal distention.  Soft, negative HJR.    Extremities:  no edema  Skin:  no xanthelasma.    Neurologic:  alert & oriented x 3.  No tremor.    Vascular:  2+ carotids without bruits.  2+ radials.        DIAGNOSTIC STUDIES (reviewed/interpreted in the clinic today):  Laboratory studies: sodium 140, potassium 3.8, creatinine 0.95, glucose 93, Hg A1c 5.2.   ECG: tracing from 3/19/2024 shows sinus rhythm with LAFB.  Echocardiogram (06/2022): EF 60 - 65%, normal RV, no significant valvular abnormalities.      IMPRESSION:  Paroxysmal AV block resulting in moderate but asymptomatic bradycardia. Concerning, but, thus far, asymptomatic AV conduction issue. Unclear etiology. Unlikely vagal mechanism as there is no concomitant PP interval prolongation. She has history of tickborne disease: Lyme's disease can cause AV block, but this is almost always transient and not paroxysmal as in this case. Further structural cardiac evaluation is reasonable. Will assess for scar/structural cardiac disease with cardiac MRI.  Palpitation correlating with sinus rhythm/sinus tachycardia.  Marked OGDEN and exercise intolerance.  (Probable) inappropriate sinus tachycardia. Possible autonomic dysfunction. Certain symptoms sound \"autonomic\". She did not exhibit classic POTS heart rate response during standing today.  Chronic tick-borne infection " (Lyme's, babesiosis).    RECOMMENDATIONS:  Cardiac MRI with contrast.  Repeat cardiac monitor in 6 months.  I encouraged the patient to contact our clinic should she develop syncope.  Regular exercise program, start slow and slowly increment. Consider activities such as using a recumbent bike, rowing machine, swimming.    It was my pleasure seeing this delightful patient.  Please feel free to call with any questions.     Margaux Quintana MD, FACC      (Chart documentation was completed, in part, using Dragon voice-recognition software. The note was reviewed, however grammatical and spelling errors may be present.)      CURRENT MEDICATIONS:  Current Outpatient Medications   Medication Sig Dispense Refill    acetaminophen (TYLENOL) 325 MG tablet Take 325-650 mg by mouth every 6 hours as needed for mild pain      diphenhydrAMINE (BENADRYL) 25 MG capsule Take 25-50 mg by mouth nightly as needed for itching or allergies       eszopiclone (LUNESTA) 2 MG tablet Take 2 mg by mouth at bedtime as needed, may repeat once for sleep      FLUoxetine (PROZAC) 10 MG capsule Take 30 mg by mouth daily      gabapentin (NEURONTIN) 100 MG capsule Take 200 mg by mouth At Bedtime      melatonin 3 MG tablet Take 6 mg by mouth At Bedtime      ondansetron (ZOFRAN ODT) 4 MG ODT tab Take 1 tablet (4 mg) by mouth every 6 hours as needed 20 tablet 0    ondansetron (ZOFRAN-ODT) 8 MG ODT tab Take 8 mg by mouth every 8 hours as needed for nausea         ALLERGIES     Allergies   Allergen Reactions    Dairy Products [Milk Protein] GI Disturbance    Gluten Meal Anaphylaxis     Other reaction(s): GI Upset    Amoxicillin Diarrhea and Nausea and Vomiting       PAST MEDICAL HISTORY:  Past Medical History:   Diagnosis Date    Gluten intolerance     Lyme disease     Migraine     Followed by [facility]    Scoliosis     Spondylolisthesis     chiro    Nayla second degree AV block     during sleep       PAST SURGICAL HISTORY:  Past Surgical History:    Procedure Laterality Date    Colonoscopy and EGD  2011    gastris, otherwise normal    wisdom teeth         FAMILY HISTORY:  Family History   Problem Relation Age of Onset    Hypertension Mother     Breast Cancer Paternal Aunt     Cancer Paternal Grandmother     Heart Disease Paternal Grandmother     Hypertension Father        SOCIAL HISTORY:  Social History     Socioeconomic History    Marital status: Single   Tobacco Use    Smoking status: Never    Smokeless tobacco: Never   Substance and Sexual Activity    Alcohol use: No     Alcohol/week: 0.0 standard drinks of alcohol    Drug use: No    Sexual activity: Never     CC  Morgan Vasquez MD  [address] [facility] [address]  [place] [address]    Thank you for allowing me to participate in the care of your patient.      Sincerely,     Margaux Quintana MD   [facility]

## 2024-05-01 NOTE — PROGRESS NOTES
"PHYSICIAN NOTE:  This visit was completed in person at the [facility].      I had the pleasure of seeing Ms. Alex Rivas for evaluation of bradycardia. She was referred to EP by Dr. Morgan Vasquez. Alex was accompanied by her mother today.    Very pleasant 28-year-old female with h/o nocturnal second-degree block that was documented in 2017. At the time, she was experiencing palpitation prompting evaluation with a Holter. She saw my colleague Dr. Joni Rubio who commented she had second-degree Wenckebach AV block at night, but without symptoms to suggest symptomatic bradycardia. At the time she had a normal exam, ECG and echocardiogram. She was reassured.    Her history of medical illness starts in 2011 with various symptoms prompting visits to several specialists over the years. Alex tells me that since her teenage years she has felt uncomfortable during exertion and this has only gotten worse over time. It was not until 2017 one she was diagnosed with chronic Lyme's disease and subsequently with other tickborne illnesses.    In 06/2022 the patient completed a cardiopulmonary stress test at [facility]. She exercised for 8:30 minutes on a Josh protocol, achieving a peak heart rate of 156 bpm. The formal test interpretation described \"severe exercise impairment\".    She developed severe breathlessness during mild physical activity and regular exercise not possible. She often feels her heart race or pound. She has not had eric syncope. She does have occasional lightheadedness.    She recently completed a 7-day cardiac monitor which I reviewed. It showed episodes of high degree AV block around 8:46 to 9 PM on March 28, heart rate abruptly dropped in to the 30s. The patient activated the monitor on numerous occasions, always correlating with sinus rhythm.    Alex is engaged. He is a non-smoker and does not drink alcohol.      PHYSICAL EXAMINATION:  Vital signs: 115/83, 100, 81.2 kg, BMI 30.7. Heart rate " "at rest 100 - 110. After 5 minutes of standing, her heart rate increased to 118.  General:  in no apparent distress  ENT/Mouth:  no nasal discharge.  Eyes:  normal conjunctivae.   Neck:  no thyromegaly or lymphadenopathy.  Chest/Lungs:  patient is not dyspneic.  Lungs CTA, without rales or wheezing  Cardiovascular:  nl JVP, rhythm is regular.  No gallop, murmur or rub.    Abdomen:  no abdominal distention.  Soft, negative HJR.    Extremities:  no edema  Skin:  no xanthelasma.    Neurologic:  alert & oriented x 3.  No tremor.    Vascular:  2+ carotids without bruits.  2+ radials.        DIAGNOSTIC STUDIES (reviewed/interpreted in the clinic today):  Laboratory studies: sodium 140, potassium 3.8, creatinine 0.95, glucose 93, Hg A1c 5.2.   ECG: tracing from 3/19/2024 shows sinus rhythm with LAFB.  Echocardiogram (06/2022): EF 60 - 65%, normal RV, no significant valvular abnormalities.      IMPRESSION:  Paroxysmal AV block resulting in moderate but asymptomatic bradycardia. Concerning, but, thus far, asymptomatic AV conduction issue. Unclear etiology. Unlikely vagal mechanism as there is no concomitant PP interval prolongation. She has history of tickborne disease: Lyme's disease can cause AV block, but this is almost always transient and not paroxysmal as in this case. The threshold for pacemaker implantation is high in such a young patient. No need for a device at the moment. On the other hand, further cardiac evaluation is reasonable. I have ordered a cardiac MRI.  Palpitation correlating with sinus rhythm/sinus tachycardia.  Marked OGDEN and exercise intolerance.  (Probable) inappropriate sinus tachycardia. Possible autonomic dysfunction. Certain symptoms sound \"autonomic\". She did not exhibit classic POTS heart rate response during standing today.  Chronic tick-borne infection (Lyme's, babesiosis).    RECOMMENDATIONS:  Cardiac MRI with contrast.  Repeat cardiac monitor in 6 months.  I encouraged the patient to " contact our clinic should she develop syncope.  Regular exercise program, start slow and slowly increment. Consider activities such as using a recumbent bike, rowing machine, swimming.    It was my pleasure seeing this delightful patient.  Please feel free to call with any questions.     Margaux Quintana MD, [facility]      (Chart documentation was completed, in part, using Dragon voice-recognition software. The note was reviewed, however grammatical and spelling errors may be present.)      CURRENT MEDICATIONS:  Current Outpatient Medications   Medication Sig Dispense Refill    acetaminophen (TYLENOL) 325 MG tablet Take 325-650 mg by mouth every 6 hours as needed for mild pain      diphenhydrAMINE (BENADRYL) 25 MG capsule Take 25-50 mg by mouth nightly as needed for itching or allergies       eszopiclone (LUNESTA) 2 MG tablet Take 2 mg by mouth at bedtime as needed, may repeat once for sleep      FLUoxetine (PROZAC) 10 MG capsule Take 30 mg by mouth daily      gabapentin (NEURONTIN) 100 MG capsule Take 200 mg by mouth At Bedtime      melatonin 3 MG tablet Take 6 mg by mouth At Bedtime      ondansetron (ZOFRAN ODT) 4 MG ODT tab Take 1 tablet (4 mg) by mouth every 6 hours as needed 20 tablet 0    ondansetron (ZOFRAN-ODT) 8 MG ODT tab Take 8 mg by mouth every 8 hours as needed for nausea         ALLERGIES     Allergies   Allergen Reactions    Dairy Products [Milk Protein] GI Disturbance    Gluten Meal Anaphylaxis     Other reaction(s): GI Upset    Amoxicillin Diarrhea and Nausea and Vomiting       PAST MEDICAL HISTORY:  Past Medical History:   Diagnosis Date    Gluten intolerance     Lyme disease     Migraine     Followed by [facility]    Scoliosis     Spondylolisthesis     chiro    Wenckebach second degree AV block     during sleep       PAST SURGICAL HISTORY:  Past Surgical History:   Procedure Laterality Date    Colonoscopy and EGD  2011    gastris, otherwise normal    wisdom teeth         FAMILY HISTORY:  Family  History   Problem Relation Age of Onset    Hypertension Mother     Breast Cancer Paternal Aunt     Cancer Paternal Grandmother     Heart Disease Paternal Grandmother     Hypertension Father        SOCIAL HISTORY:  Social History     Socioeconomic History    Marital status: Single   Tobacco Use    Smoking status: Never    Smokeless tobacco: Never   Substance and Sexual Activity    Alcohol use: No     Alcohol/week: 0.0 standard drinks of alcohol    Drug use: No    Sexual activity: Never           RENETTA Vasquez MD  [address]  [place] [id number]

## 2024-05-02 ENCOUNTER — TELEPHONE (OUTPATIENT)
Dept: CARDIOLOGY | Facility: CLINIC | Age: 29
End: 2024-05-02
Payer: COMMERCIAL

## 2024-05-02 DIAGNOSIS — R00.2 PALPITATIONS: Primary | ICD-10-CM

## 2024-06-03 ENCOUNTER — HOSPITAL ENCOUNTER (OUTPATIENT)
Dept: CARDIOLOGY | Facility: CLINIC | Age: 29
Discharge: HOME | End: 2024-06-03
Attending: INTERNAL MEDICINE | Admitting: INTERNAL MEDICINE
Payer: COMMERCIAL

## 2024-06-03 VITALS — DIASTOLIC BLOOD PRESSURE: 77 MMHG | HEART RATE: 86 BPM | SYSTOLIC BLOOD PRESSURE: 113 MMHG

## 2024-06-03 DIAGNOSIS — I44.39 HIGH-GRADE ATRIOVENTRICULAR BLOCK: ICD-10-CM

## 2024-06-03 PROCEDURE — A9585 GADOBUTROL INJECTION: HCPCS | Performed by: INTERNAL MEDICINE

## 2024-06-03 PROCEDURE — 75561 CARDIAC MRI FOR MORPH W/DYE: CPT

## 2024-06-03 PROCEDURE — 75561 CARDIAC MRI FOR MORPH W/DYE: CPT | Mod: 26 | Performed by: INTERNAL MEDICINE

## 2024-06-03 PROCEDURE — 255N000002 HC RX 255 OP 636: Performed by: INTERNAL MEDICINE

## 2024-06-03 RX ORDER — ONDANSETRON 2 MG/ML
4 INJECTION INTRAMUSCULAR; INTRAVENOUS
Status: DISCONTINUED | OUTPATIENT
Start: 2024-06-03 | End: 2024-06-04 | Stop reason: HOSPADM

## 2024-06-03 RX ORDER — ACYCLOVIR 200 MG/1
0-1 CAPSULE ORAL
Status: DISCONTINUED | OUTPATIENT
Start: 2024-06-03 | End: 2024-06-04 | Stop reason: HOSPADM

## 2024-06-03 RX ORDER — DIPHENHYDRAMINE HYDROCHLORIDE 50 MG/ML
25-50 INJECTION INTRAMUSCULAR; INTRAVENOUS
Status: DISCONTINUED | OUTPATIENT
Start: 2024-06-03 | End: 2024-06-04 | Stop reason: HOSPADM

## 2024-06-03 RX ORDER — GADOBUTROL 604.72 MG/ML
10 INJECTION INTRAVENOUS ONCE
Status: COMPLETED | OUTPATIENT
Start: 2024-06-03 | End: 2024-06-03

## 2024-06-03 RX ORDER — METHYLPREDNISOLONE SODIUM SUCCINATE 125 MG/2ML
125 INJECTION, POWDER, LYOPHILIZED, FOR SOLUTION INTRAMUSCULAR; INTRAVENOUS
Status: DISCONTINUED | OUTPATIENT
Start: 2024-06-03 | End: 2024-06-04 | Stop reason: HOSPADM

## 2024-06-03 RX ORDER — DIPHENHYDRAMINE HCL 25 MG
25 CAPSULE ORAL
Status: DISCONTINUED | OUTPATIENT
Start: 2024-06-03 | End: 2024-06-04 | Stop reason: HOSPADM

## 2024-06-03 RX ADMIN — GADOBUTROL 10 ML: 604.72 INJECTION INTRAVENOUS at 08:40

## 2024-06-05 ENCOUNTER — TRANSFERRED RECORDS (OUTPATIENT)
Dept: HEALTH INFORMATION MANAGEMENT | Facility: CLINIC | Age: 29
End: 2024-06-05

## 2024-06-11 ENCOUNTER — MYC MEDICAL ADVICE (OUTPATIENT)
Dept: CARDIOLOGY | Facility: CLINIC | Age: 29
End: 2024-06-11
Payer: COMMERCIAL

## 2024-06-11 NOTE — TELEPHONE ENCOUNTER
Spoke to patient to clarify monitor does not need to be worn until November.  It will be mailed out first week of November.  Patient provided verbal understanding.  CINDY Flynn

## 2024-07-02 ENCOUNTER — TRANSFERRED RECORDS (OUTPATIENT)
Dept: HEALTH INFORMATION MANAGEMENT | Facility: CLINIC | Age: 29
End: 2024-07-02
Payer: COMMERCIAL

## 2024-07-07 ENCOUNTER — HEALTH MAINTENANCE LETTER (OUTPATIENT)
Age: 29
End: 2024-07-07

## 2024-07-25 ENCOUNTER — TRANSCRIBE ORDERS (OUTPATIENT)
Dept: OTHER | Age: 29
End: 2024-07-25

## 2024-07-25 DIAGNOSIS — E72.11 HOMOCYSTINURIA (H): ICD-10-CM

## 2024-07-25 DIAGNOSIS — R79.89 OTHER SPECIFIED ABNORMAL FINDINGS OF BLOOD CHEMISTRY: ICD-10-CM

## 2024-07-25 DIAGNOSIS — R53.83 OTHER FATIGUE: ICD-10-CM

## 2024-07-25 DIAGNOSIS — R63.5 ABNORMAL WEIGHT GAIN: Primary | ICD-10-CM

## 2024-07-25 DIAGNOSIS — K90.89 OTHER INTESTINAL MALABSORPTION: ICD-10-CM

## 2024-07-25 DIAGNOSIS — R41.840 ATTENTION AND CONCENTRATION DEFICIT: ICD-10-CM

## 2024-07-25 DIAGNOSIS — R06.09 OTHER FORMS OF DYSPNEA: ICD-10-CM

## 2024-07-25 DIAGNOSIS — E53.9 VITAMIN B DEFICIENCY, UNSPECIFIED: ICD-10-CM

## 2024-07-25 DIAGNOSIS — R23.2 FLUSHING: ICD-10-CM

## 2024-07-25 DIAGNOSIS — K59.09 OTHER CONSTIPATION: ICD-10-CM

## 2024-07-25 DIAGNOSIS — E53.8 DEFICIENCY OF OTHER SPECIFIED B GROUP VITAMINS: ICD-10-CM

## 2024-07-29 ENCOUNTER — OFFICE VISIT (OUTPATIENT)
Dept: OTOLARYNGOLOGY | Facility: CLINIC | Age: 29
End: 2024-07-29
Payer: COMMERCIAL

## 2024-07-29 ENCOUNTER — TRANSFERRED RECORDS (OUTPATIENT)
Dept: HEALTH INFORMATION MANAGEMENT | Facility: CLINIC | Age: 29
End: 2024-07-29

## 2024-07-29 DIAGNOSIS — R06.09 OTHER FORMS OF DYSPNEA: ICD-10-CM

## 2024-07-29 DIAGNOSIS — R53.83 OTHER FATIGUE: ICD-10-CM

## 2024-07-29 DIAGNOSIS — E72.11 HOMOCYSTINURIA (H): ICD-10-CM

## 2024-07-29 DIAGNOSIS — K59.09 OTHER CONSTIPATION: ICD-10-CM

## 2024-07-29 DIAGNOSIS — R68.2 DRY MOUTH AND EYES: Primary | ICD-10-CM

## 2024-07-29 DIAGNOSIS — E53.8 DEFICIENCY OF OTHER SPECIFIED B GROUP VITAMINS: ICD-10-CM

## 2024-07-29 DIAGNOSIS — K90.89 OTHER INTESTINAL MALABSORPTION: ICD-10-CM

## 2024-07-29 DIAGNOSIS — E53.9 VITAMIN B DEFICIENCY, UNSPECIFIED: ICD-10-CM

## 2024-07-29 DIAGNOSIS — R23.2 FLUSHING: ICD-10-CM

## 2024-07-29 DIAGNOSIS — R63.5 ABNORMAL WEIGHT GAIN: ICD-10-CM

## 2024-07-29 DIAGNOSIS — R79.89 OTHER SPECIFIED ABNORMAL FINDINGS OF BLOOD CHEMISTRY: ICD-10-CM

## 2024-07-29 DIAGNOSIS — R41.840 ATTENTION AND CONCENTRATION DEFICIT: ICD-10-CM

## 2024-07-29 DIAGNOSIS — H04.123 DRY MOUTH AND EYES: Primary | ICD-10-CM

## 2024-07-29 PROCEDURE — 99203 OFFICE O/P NEW LOW 30 MIN: CPT | Performed by: OTOLARYNGOLOGY

## 2024-07-29 NOTE — LETTER
"7/29/2024      Alex Rivas  [address] [facility] [address] [facility] [address]      Dear Colleague,    Thank you for referring your patient, Alex Rivas, to the [facility]. Please see a copy of my visit note below.    CHIEF COMPLAINT: Patient presents with:  Consult: Discuss Biopsy of salivary gland, testing for Sjogren         HISTORY OF PRESENT ILLNESS    Alex was seen at the behest of Jeannie Mtz MD  lower lip biopsy.  She is referred for dry mouth/dry eyes for years.  She has bloodwork for Sjogrens which has been negative according to outside testing.   She is on adderall for ADHD but states her symptoms began before starting this medication.  Other medical issues:  MAST cell activation syndrome, POTS, migraine, chronic lyme. 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              REVIEW OF SYSTEMS    Review of Systems as per HPI and PMHx, otherwise 10 system review system are negative.       ALLERGIES    Dairy products [milk protein], Gluten meal, Amoxicillin, and Seasonal allergies    CURRENT MEDICATIONS      Current Outpatient Medications:      acetaminophen (TYLENOL) 325 MG tablet, Take 325-650 mg by mouth every 6 hours as needed for mild pain, Disp: , Rfl:      cetirizine (ZYRTEC) 10 MG tablet, Take 10 mg by mouth daily, Disp: , Rfl:      clotrimazole-betamethasone (LOTRISONE) 1-0.05 % external cream, as needed, Disp: , Rfl:      cyanocobalamin (VITAMIN B-12) 1000 MCG sublingual tablet, Place under the tongue daily W/ Folate 800mcg, Disp: , Rfl:      diphenhydrAMINE (BENADRYL) 25 MG capsule, Take 50 mg by mouth at bedtime, Disp: , Rfl:      eszopiclone (LUNESTA) 2 MG tablet, Take 2 mg by mouth at bedtime, Disp: , Rfl:      famotidine (PEPCID) 20 MG tablet, Take 20 mg by mouth daily, Disp: , Rfl:      FLUoxetine (PROZAC) 10 MG capsule, Take 30 mg by mouth daily, Disp: , Rfl:      gabapentin (NEURONTIN) " 100 MG capsule, Take 200 mg by mouth At Bedtime, Disp: , Rfl:      melatonin 3 MG tablet, Take 3 mg by mouth at bedtime, Disp: , Rfl:      metFORMIN (GLUCOPHAGE) 500 MG tablet, Take 500 mg by mouth 2 times daily (with meals), Disp: , Rfl:      naproxen sodium 220 MG capsule, Take by mouth as needed, Disp: , Rfl:      ondansetron (ZOFRAN ODT) 4 MG ODT tab, Take 1 tablet (4 mg) by mouth every 6 hours as needed, Disp: 20 tablet, Rfl: 0     OZEMPIC, 0.25 OR 0.5 MG/DOSE, 2 MG/3ML pen, Inject 0.5 mg Subcutaneous every 7 days On Thursdays, Disp: , Rfl:      topiramate (TOPAMAX) 100 MG tablet, Take 100 mg by mouth at bedtime, Disp: , Rfl:      Vitamin D3 (CHOLECALCIFEROL) 125 MCG (5000 UT) tablet, Take 1 tablet by mouth daily, Disp: , Rfl:      PAST MEDICAL HISTORY    PAST MEDICAL HISTORY:   Past Medical History:   Diagnosis Date     Gluten intolerance      Lyme disease      Migraine     Followed by [facility]     Scoliosis      Spondylolisthesis     [facility]     Arabach second degree AV block     during sleep       PAST SURGICAL HISTORY    PAST SURGICAL HISTORY:   Past Surgical History:   Procedure Laterality Date     Colonoscopy and EGD  2011    gastris, otherwise normal     wisdom teeth         FAMILY  HISTORY    FAMILY HISTORY:   Family History   Problem Relation Age of Onset     Hypertension Mother      Breast Cancer Paternal Aunt      Cancer Paternal Grandmother      Heart Disease Paternal Grandmother      Hypertension Father        SOCIAL HISTORY    SOCIAL HISTORY:   Social History     Tobacco Use     Smoking status: Never     Smokeless tobacco: Never   Substance Use Topics     Alcohol use: No     Alcohol/week: 0.0 standard drinks of alcohol        PHYSICAL EXAM    HEAD: Normal appearance and symmetry:  No cutaneous lesions.      NECK:  supple     EARS:    Right:   TM intact   LEFT:   TM intact    EYES:  EOMI    CN VII/XII:  intact     NOSE:     Dorsum:   straight  Septum:  midline  Mucosa:  moist        ORAL  CAVITY/OROPHARYNX:     Lips:  Normal.  Tongue: normal, midline  Mucosa:   no lesions     NECK:  Trachea:  midline.              Thyroid:  normal              Adenopathy:  none        NEURO:   Alert and Oriented     GAIT AND STATION:  normal     RESPIRATORY:   Symmetry and Respiratory effort     PSYCH:  Normal mood and affect     SKIN:   warm and dry         IMPRESSION:    Encounter Diagnoses   Name Primary?     Abnormal weight gain      Attention and concentration deficit      Deficiency of other specified B group vitamins      Flushing      Homocystinuria (H24)      Other constipation      Other fatigue      Other forms of dyspnea      Other intestinal malabsorption      Other specified abnormal findings of blood chemistry      Vitamin B deficiency, unspecified      Dry mouth and eyes Yes     Recommend Rheumatology referral but patient declines.  She wants to proceed with lower lip biopsy.     RECOMMENDATIONS:    Will schedule in office lower lip biopsy.   R/B/A           Again, thank you for allowing me to participate in the care of your patient.        Sincerely,        Kana Rios MD

## 2024-07-29 NOTE — PROGRESS NOTES
"CHIEF COMPLAINT: Patient presents with:  Consult: Discuss Biopsy of salivary gland, testing for Sjogren         HISTORY OF PRESENT ILLNESS    Alex was seen at the behest of Jeannie Mtz MD  lower lip biopsy.  She is referred for dry mouth/dry eyes for years.  She has bloodwork for Sjogrens which has been negative according to outside testing.   She is on adderall for ADHD but states her symptoms began before starting this medication.  Other medical issues:  MAST cell activation syndrome, POTS, migraine, chronic lyme. 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              REVIEW OF SYSTEMS    Review of Systems as per HPI and PMHx, otherwise 10 system review system are negative.       ALLERGIES    Dairy products [milk protein], Gluten meal, Amoxicillin, and Seasonal allergies    CURRENT MEDICATIONS      Current Outpatient Medications:     acetaminophen (TYLENOL) 325 MG tablet, Take 325-650 mg by mouth every 6 hours as needed for mild pain, Disp: , Rfl:     cetirizine (ZYRTEC) 10 MG tablet, Take 10 mg by mouth daily, Disp: , Rfl:     clotrimazole-betamethasone (LOTRISONE) 1-0.05 % external cream, as needed, Disp: , Rfl:     cyanocobalamin (VITAMIN B-12) 1000 MCG sublingual tablet, Place under the tongue daily W/ Folate 800mcg, Disp: , Rfl:     diphenhydrAMINE (BENADRYL) 25 MG capsule, Take 50 mg by mouth at bedtime, Disp: , Rfl:     eszopiclone (LUNESTA) 2 MG tablet, Take 2 mg by mouth at bedtime, Disp: , Rfl:     famotidine (PEPCID) 20 MG tablet, Take 20 mg by mouth daily, Disp: , Rfl:     FLUoxetine (PROZAC) 10 MG capsule, Take 30 mg by mouth daily, Disp: , Rfl:     gabapentin (NEURONTIN) 100 MG capsule, Take 200 mg by mouth At Bedtime, Disp: , Rfl:     melatonin 3 MG tablet, Take 3 mg by mouth at bedtime, Disp: , Rfl:     metFORMIN (GLUCOPHAGE) 500 MG tablet, Take 500 mg by mouth 2 times daily (with meals), Disp: , Rfl:     naproxen " sodium 220 MG capsule, Take by mouth as needed, Disp: , Rfl:     ondansetron (ZOFRAN ODT) 4 MG ODT tab, Take 1 tablet (4 mg) by mouth every 6 hours as needed, Disp: 20 tablet, Rfl: 0    OZEMPIC, 0.25 OR 0.5 MG/DOSE, 2 MG/3ML pen, Inject 0.5 mg Subcutaneous every 7 days On Thursdays, Disp: , Rfl:     topiramate (TOPAMAX) 100 MG tablet, Take 100 mg by mouth at bedtime, Disp: , Rfl:     Vitamin D3 (CHOLECALCIFEROL) 125 MCG (5000 UT) tablet, Take 1 tablet by mouth daily, Disp: , Rfl:      PAST MEDICAL HISTORY    PAST MEDICAL HISTORY:   Past Medical History:   Diagnosis Date    Gluten intolerance     Lyme disease     Migraine     Followed by [facility]    Scoliosis     Spondylolisthesis     chiro    Nayla second degree AV block     during sleep       PAST SURGICAL HISTORY    PAST SURGICAL HISTORY:   Past Surgical History:   Procedure Laterality Date    Colonoscopy and EGD  2011    gastris, otherwise normal    wisdom teeth         FAMILY  HISTORY    FAMILY HISTORY:   Family History   Problem Relation Age of Onset    Hypertension Mother     Breast Cancer Paternal Aunt     Cancer Paternal Grandmother     Heart Disease Paternal Grandmother     Hypertension Father        SOCIAL HISTORY    SOCIAL HISTORY:   Social History     Tobacco Use    Smoking status: Never    Smokeless tobacco: Never   Substance Use Topics    Alcohol use: No     Alcohol/week: 0.0 standard drinks of alcohol        PHYSICAL EXAM    HEAD: Normal appearance and symmetry:  No cutaneous lesions.      NECK:  supple     EARS:    Right:   TM intact   LEFT:   TM intact    EYES:  EOMI    CN VII/XII:  intact     NOSE:     Dorsum:   straight  Septum:  midline  Mucosa:  moist        ORAL CAVITY/OROPHARYNX:     Lips:  Normal.  Tongue: normal, midline  Mucosa:   no lesions     NECK:  Trachea:  midline.              Thyroid:  normal              Adenopathy:  none        NEURO:   Alert and Oriented     GAIT AND STATION:  normal     RESPIRATORY:   Symmetry and  Respiratory effort     PSYCH:  Normal mood and affect     SKIN:   warm and dry         IMPRESSION:    Encounter Diagnoses   Name Primary?    Abnormal weight gain     Attention and concentration deficit     Deficiency of other specified B group vitamins     Flushing     Homocystinuria (H24)     Other constipation     Other fatigue     Other forms of dyspnea     Other intestinal malabsorption     Other specified abnormal findings of blood chemistry     Vitamin B deficiency, unspecified     Dry mouth and eyes Yes     Recommend Rheumatology referral but patient declines.  She wants to proceed with lower lip biopsy.     RECOMMENDATIONS:    Will refer to Dr. Alfredo Motta at [facility] for biopsy since my next available procedure slot is in November.   No charge for today's visit.

## 2024-11-02 ENCOUNTER — ORDERS ONLY (AUTO-RELEASED) (OUTPATIENT)
Dept: CARDIOLOGY | Facility: CLINIC | Age: 29
End: 2024-11-02
Payer: COMMERCIAL

## 2024-11-02 DIAGNOSIS — R00.2 PALPITATIONS: ICD-10-CM

## 2024-11-25 ENCOUNTER — TELEPHONE (OUTPATIENT)
Dept: CARDIOLOGY | Facility: CLINIC | Age: 29
End: 2024-11-25
Payer: COMMERCIAL

## 2024-11-25 DIAGNOSIS — I45.9 HEART BLOCK: Primary | ICD-10-CM

## 2025-07-13 ENCOUNTER — HEALTH MAINTENANCE LETTER (OUTPATIENT)
Age: 30
End: 2025-07-13